# Patient Record
Sex: MALE | Race: WHITE | NOT HISPANIC OR LATINO | URBAN - METROPOLITAN AREA
[De-identification: names, ages, dates, MRNs, and addresses within clinical notes are randomized per-mention and may not be internally consistent; named-entity substitution may affect disease eponyms.]

---

## 2024-04-09 ENCOUNTER — INPATIENT (INPATIENT)
Facility: HOSPITAL | Age: 89
LOS: 6 days | Discharge: NURSING, REHAB OR LONG-TERM CARE | DRG: 480 | End: 2024-04-16
Attending: INTERNAL MEDICINE | Admitting: INTERNAL MEDICINE
Payer: MEDICARE

## 2024-04-09 VITALS
OXYGEN SATURATION: 97 % | DIASTOLIC BLOOD PRESSURE: 58 MMHG | SYSTOLIC BLOOD PRESSURE: 133 MMHG | HEART RATE: 95 BPM | RESPIRATION RATE: 18 BRPM

## 2024-04-09 DIAGNOSIS — S72.001A FRACTURE OF UNSPECIFIED PART OF NECK OF RIGHT FEMUR, INITIAL ENCOUNTER FOR CLOSED FRACTURE: ICD-10-CM
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
## 2024-04-09 LAB
ALBUMIN SERPL ELPH-MCNC: 3.2 G/DL — LOW (ref 3.3–5)
ALBUMIN SERPL ELPH-MCNC: 3.7 G/DL — SIGNIFICANT CHANGE UP (ref 3.3–5)
ALP SERPL-CCNC: 73 U/L — SIGNIFICANT CHANGE UP (ref 40–120)
ALP SERPL-CCNC: 77 U/L — SIGNIFICANT CHANGE UP (ref 40–120)
ALT FLD-CCNC: 11 U/L — SIGNIFICANT CHANGE UP (ref 10–45)
ALT FLD-CCNC: 16 U/L — SIGNIFICANT CHANGE UP (ref 10–45)
ANION GAP SERPL CALC-SCNC: 10 MMOL/L — SIGNIFICANT CHANGE UP (ref 5–17)
ANION GAP SERPL CALC-SCNC: 14 MMOL/L — SIGNIFICANT CHANGE UP (ref 5–17)
ANION GAP SERPL CALC-SCNC: 17 MMOL/L — SIGNIFICANT CHANGE UP (ref 5–17)
APPEARANCE UR: CLEAR — SIGNIFICANT CHANGE UP
APTT BLD: 29.4 SEC — SIGNIFICANT CHANGE UP (ref 24.5–35.6)
AST SERPL-CCNC: 12 U/L — SIGNIFICANT CHANGE UP (ref 10–40)
AST SERPL-CCNC: 57 U/L — HIGH (ref 10–40)
BACTERIA # UR AUTO: NEGATIVE /HPF — SIGNIFICANT CHANGE UP
BASE EXCESS BLDV CALC-SCNC: -2.5 MMOL/L — LOW (ref -2–3)
BASOPHILS # BLD AUTO: 0.03 K/UL — SIGNIFICANT CHANGE UP (ref 0–0.2)
BASOPHILS NFR BLD AUTO: 0.5 % — SIGNIFICANT CHANGE UP (ref 0–2)
BILIRUB SERPL-MCNC: 0.6 MG/DL — SIGNIFICANT CHANGE UP (ref 0.2–1.2)
BILIRUB SERPL-MCNC: 0.7 MG/DL — SIGNIFICANT CHANGE UP (ref 0.2–1.2)
BILIRUB UR-MCNC: NEGATIVE — SIGNIFICANT CHANGE UP
BLD GP AB SCN SERPL QL: NEGATIVE — SIGNIFICANT CHANGE UP
BUN SERPL-MCNC: 28 MG/DL — HIGH (ref 7–23)
BUN SERPL-MCNC: 28 MG/DL — HIGH (ref 7–23)
BUN SERPL-MCNC: 29 MG/DL — HIGH (ref 7–23)
CALCIUM SERPL-MCNC: 8.1 MG/DL — LOW (ref 8.4–10.5)
CALCIUM SERPL-MCNC: 8.2 MG/DL — LOW (ref 8.4–10.5)
CALCIUM SERPL-MCNC: 8.6 MG/DL — SIGNIFICANT CHANGE UP (ref 8.4–10.5)
CAST: 6 /LPF — HIGH (ref 0–4)
CHLORIDE SERPL-SCNC: 109 MMOL/L — HIGH (ref 96–108)
CHLORIDE SERPL-SCNC: 109 MMOL/L — HIGH (ref 96–108)
CHLORIDE SERPL-SCNC: 110 MMOL/L — HIGH (ref 96–108)
CK SERPL-CCNC: 193 U/L — SIGNIFICANT CHANGE UP (ref 30–200)
CK SERPL-CCNC: 280 U/L — HIGH (ref 30–200)
CO2 BLDV-SCNC: 27 MMOL/L — HIGH (ref 22–26)
CO2 SERPL-SCNC: 17 MMOL/L — LOW (ref 22–31)
CO2 SERPL-SCNC: 18 MMOL/L — LOW (ref 22–31)
CO2 SERPL-SCNC: 23 MMOL/L — SIGNIFICANT CHANGE UP (ref 22–31)
COLOR SPEC: YELLOW — SIGNIFICANT CHANGE UP
CREAT SERPL-MCNC: 1.21 MG/DL — SIGNIFICANT CHANGE UP (ref 0.5–1.3)
CREAT SERPL-MCNC: 1.28 MG/DL — SIGNIFICANT CHANGE UP (ref 0.5–1.3)
CREAT SERPL-MCNC: 1.29 MG/DL — SIGNIFICANT CHANGE UP (ref 0.5–1.3)
DIFF PNL FLD: NEGATIVE — SIGNIFICANT CHANGE UP
EGFR: 50 ML/MIN/1.73M2 — LOW
EGFR: 51 ML/MIN/1.73M2 — LOW
EGFR: 54 ML/MIN/1.73M2 — LOW
EOSINOPHIL # BLD AUTO: 0.19 K/UL — SIGNIFICANT CHANGE UP (ref 0–0.5)
EOSINOPHIL NFR BLD AUTO: 3.3 % — SIGNIFICANT CHANGE UP (ref 0–6)
ETHANOL SERPL-MCNC: <10 MG/DL — SIGNIFICANT CHANGE UP (ref 0–10)
GAS PNL BLDV: SIGNIFICANT CHANGE UP
GLUCOSE SERPL-MCNC: 140 MG/DL — HIGH (ref 70–99)
GLUCOSE SERPL-MCNC: 157 MG/DL — HIGH (ref 70–99)
GLUCOSE SERPL-MCNC: 160 MG/DL — HIGH (ref 70–99)
GLUCOSE UR QL: NEGATIVE MG/DL — SIGNIFICANT CHANGE UP
HCO3 BLDV-SCNC: 25 MMOL/L — SIGNIFICANT CHANGE UP (ref 22–29)
HCT VFR BLD CALC: 43.1 % — SIGNIFICANT CHANGE UP (ref 39–50)
HGB BLD-MCNC: 13.8 G/DL — SIGNIFICANT CHANGE UP (ref 13–17)
HYALINE CASTS # UR AUTO: PRESENT
IMM GRANULOCYTES NFR BLD AUTO: 1.5 % — HIGH (ref 0–0.9)
INR BLD: 1.07 RATIO — SIGNIFICANT CHANGE UP (ref 0.85–1.18)
KETONES UR-MCNC: NEGATIVE MG/DL — SIGNIFICANT CHANGE UP
LEUKOCYTE ESTERASE UR-ACNC: NEGATIVE — SIGNIFICANT CHANGE UP
LIDOCAIN IGE QN: 21 U/L — SIGNIFICANT CHANGE UP (ref 7–60)
LYMPHOCYTES # BLD AUTO: 0.95 K/UL — LOW (ref 1–3.3)
LYMPHOCYTES # BLD AUTO: 16.3 % — SIGNIFICANT CHANGE UP (ref 13–44)
MCHC RBC-ENTMCNC: 28.9 PG — SIGNIFICANT CHANGE UP (ref 27–34)
MCHC RBC-ENTMCNC: 32 GM/DL — SIGNIFICANT CHANGE UP (ref 32–36)
MCV RBC AUTO: 90.2 FL — SIGNIFICANT CHANGE UP (ref 80–100)
MONOCYTES # BLD AUTO: 0.3 K/UL — SIGNIFICANT CHANGE UP (ref 0–0.9)
MONOCYTES NFR BLD AUTO: 5.1 % — SIGNIFICANT CHANGE UP (ref 2–14)
NEUTROPHILS # BLD AUTO: 4.28 K/UL — SIGNIFICANT CHANGE UP (ref 1.8–7.4)
NEUTROPHILS NFR BLD AUTO: 73.3 % — SIGNIFICANT CHANGE UP (ref 43–77)
NITRITE UR-MCNC: NEGATIVE — SIGNIFICANT CHANGE UP
NRBC # BLD: 0 /100 WBCS — SIGNIFICANT CHANGE UP (ref 0–0)
PCO2 BLDV: 55 MMHG — SIGNIFICANT CHANGE UP (ref 42–55)
PH BLDV: 7.27 — LOW (ref 7.32–7.43)
PH UR: 5.5 — SIGNIFICANT CHANGE UP (ref 5–8)
PLATELET # BLD AUTO: 158 K/UL — SIGNIFICANT CHANGE UP (ref 150–400)
PO2 BLDV: 29 MMHG — SIGNIFICANT CHANGE UP (ref 25–45)
POTASSIUM SERPL-MCNC: 4.1 MMOL/L — SIGNIFICANT CHANGE UP (ref 3.5–5.3)
POTASSIUM SERPL-MCNC: 4.3 MMOL/L — SIGNIFICANT CHANGE UP (ref 3.5–5.3)
POTASSIUM SERPL-MCNC: 7.6 MMOL/L — CRITICAL HIGH (ref 3.5–5.3)
POTASSIUM SERPL-SCNC: 4.1 MMOL/L — SIGNIFICANT CHANGE UP (ref 3.5–5.3)
POTASSIUM SERPL-SCNC: 4.3 MMOL/L — SIGNIFICANT CHANGE UP (ref 3.5–5.3)
POTASSIUM SERPL-SCNC: 7.6 MMOL/L — CRITICAL HIGH (ref 3.5–5.3)
PROT SERPL-MCNC: 5.5 G/DL — LOW (ref 6–8.3)
PROT SERPL-MCNC: 6.6 G/DL — SIGNIFICANT CHANGE UP (ref 6–8.3)
PROT UR-MCNC: 30 MG/DL
PROTHROM AB SERPL-ACNC: 11.8 SEC — SIGNIFICANT CHANGE UP (ref 9.5–13)
RBC # BLD: 4.78 M/UL — SIGNIFICANT CHANGE UP (ref 4.2–5.8)
RBC # FLD: 13.9 % — SIGNIFICANT CHANGE UP (ref 10.3–14.5)
RBC CASTS # UR COMP ASSIST: 2 /HPF — SIGNIFICANT CHANGE UP (ref 0–4)
REVIEW: SIGNIFICANT CHANGE UP
RH IG SCN BLD-IMP: NEGATIVE — SIGNIFICANT CHANGE UP
SAO2 % BLDV: 40.9 % — LOW (ref 67–88)
SODIUM SERPL-SCNC: 140 MMOL/L — SIGNIFICANT CHANGE UP (ref 135–145)
SODIUM SERPL-SCNC: 142 MMOL/L — SIGNIFICANT CHANGE UP (ref 135–145)
SODIUM SERPL-SCNC: 145 MMOL/L — SIGNIFICANT CHANGE UP (ref 135–145)
SP GR SPEC: >1.03 — HIGH (ref 1–1.03)
SQUAMOUS # UR AUTO: 2 /HPF — SIGNIFICANT CHANGE UP (ref 0–5)
TROPONIN T, HIGH SENSITIVITY RESULT: 106 NG/L — HIGH (ref 0–51)
TROPONIN T, HIGH SENSITIVITY RESULT: 29 NG/L — SIGNIFICANT CHANGE UP (ref 0–51)
TROPONIN T, HIGH SENSITIVITY RESULT: 85 NG/L — HIGH (ref 0–51)
UROBILINOGEN FLD QL: 0.2 MG/DL — SIGNIFICANT CHANGE UP (ref 0.2–1)
WBC # BLD: 5.84 K/UL — SIGNIFICANT CHANGE UP (ref 3.8–10.5)
WBC # FLD AUTO: 5.84 K/UL — SIGNIFICANT CHANGE UP (ref 3.8–10.5)
WBC UR QL: 2 /HPF — SIGNIFICANT CHANGE UP (ref 0–5)

## 2024-04-09 PROCEDURE — 12011 RPR F/E/E/N/L/M 2.5 CM/<: CPT

## 2024-04-09 PROCEDURE — 73502 X-RAY EXAM HIP UNI 2-3 VIEWS: CPT | Mod: 26,RT

## 2024-04-09 PROCEDURE — 72170 X-RAY EXAM OF PELVIS: CPT | Mod: 26,XE

## 2024-04-09 PROCEDURE — 73552 X-RAY EXAM OF FEMUR 2/>: CPT | Mod: 26,RT

## 2024-04-09 PROCEDURE — 76377 3D RENDER W/INTRP POSTPROCES: CPT | Mod: 26

## 2024-04-09 PROCEDURE — 73120 X-RAY EXAM OF HAND: CPT | Mod: 26,50

## 2024-04-09 PROCEDURE — 71260 CT THORAX DX C+: CPT | Mod: 26,MC

## 2024-04-09 PROCEDURE — 73560 X-RAY EXAM OF KNEE 1 OR 2: CPT | Mod: 26,RT

## 2024-04-09 PROCEDURE — 12001 RPR S/N/AX/GEN/TRNK 2.5CM/<: CPT | Mod: 59

## 2024-04-09 PROCEDURE — 93321 DOPPLER ECHO F-UP/LMTD STD: CPT | Mod: 26

## 2024-04-09 PROCEDURE — 70450 CT HEAD/BRAIN W/O DYE: CPT | Mod: 26,MC

## 2024-04-09 PROCEDURE — 71045 X-RAY EXAM CHEST 1 VIEW: CPT | Mod: 26

## 2024-04-09 PROCEDURE — 72125 CT NECK SPINE W/O DYE: CPT | Mod: 26,MC

## 2024-04-09 PROCEDURE — 99285 EMERGENCY DEPT VISIT HI MDM: CPT | Mod: 25

## 2024-04-09 PROCEDURE — 74177 CT ABD & PELVIS W/CONTRAST: CPT | Mod: 26,MC

## 2024-04-09 PROCEDURE — 93308 TTE F-UP OR LMTD: CPT | Mod: 26

## 2024-04-09 PROCEDURE — 72192 CT PELVIS W/O DYE: CPT | Mod: 26,59,MC

## 2024-04-09 RX ORDER — HALOPERIDOL DECANOATE 100 MG/ML
5 INJECTION INTRAMUSCULAR ONCE
Refills: 0 | Status: COMPLETED | OUTPATIENT
Start: 2024-04-09 | End: 2024-04-09

## 2024-04-09 RX ORDER — MORPHINE SULFATE 50 MG/1
4 CAPSULE, EXTENDED RELEASE ORAL ONCE
Refills: 0 | Status: DISCONTINUED | OUTPATIENT
Start: 2024-04-09 | End: 2024-04-09

## 2024-04-09 RX ORDER — METOPROLOL TARTRATE 50 MG
50 TABLET ORAL DAILY
Refills: 0 | Status: DISCONTINUED | OUTPATIENT
Start: 2024-04-09 | End: 2024-04-10

## 2024-04-09 RX ORDER — METOPROLOL TARTRATE 50 MG
1 TABLET ORAL
Refills: 0 | DISCHARGE

## 2024-04-09 RX ORDER — SODIUM CHLORIDE 9 MG/ML
1000 INJECTION INTRAMUSCULAR; INTRAVENOUS; SUBCUTANEOUS
Refills: 0 | Status: DISCONTINUED | OUTPATIENT
Start: 2024-04-09 | End: 2024-04-10

## 2024-04-09 RX ORDER — SODIUM CHLORIDE 9 MG/ML
1000 INJECTION INTRAMUSCULAR; INTRAVENOUS; SUBCUTANEOUS
Refills: 0 | Status: DISCONTINUED | OUTPATIENT
Start: 2024-04-09 | End: 2024-04-09

## 2024-04-09 RX ORDER — DONEPEZIL HYDROCHLORIDE 10 MG/1
10 TABLET, FILM COATED ORAL AT BEDTIME
Refills: 0 | Status: DISCONTINUED | OUTPATIENT
Start: 2024-04-09 | End: 2024-04-10

## 2024-04-09 RX ORDER — DONEPEZIL HYDROCHLORIDE 10 MG/1
1 TABLET, FILM COATED ORAL
Refills: 0 | DISCHARGE

## 2024-04-09 RX ORDER — TETANUS TOXOID, REDUCED DIPHTHERIA TOXOID AND ACELLULAR PERTUSSIS VACCINE, ADSORBED 5; 2.5; 8; 8; 2.5 [IU]/.5ML; [IU]/.5ML; UG/.5ML; UG/.5ML; UG/.5ML
0.5 SUSPENSION INTRAMUSCULAR ONCE
Refills: 0 | Status: COMPLETED | OUTPATIENT
Start: 2024-04-09 | End: 2024-04-09

## 2024-04-09 RX ORDER — ACETAMINOPHEN 500 MG
1000 TABLET ORAL ONCE
Refills: 0 | Status: COMPLETED | OUTPATIENT
Start: 2024-04-09 | End: 2024-04-09

## 2024-04-09 RX ORDER — SODIUM CHLORIDE 9 MG/ML
250 INJECTION INTRAMUSCULAR; INTRAVENOUS; SUBCUTANEOUS ONCE
Refills: 0 | Status: COMPLETED | OUTPATIENT
Start: 2024-04-09 | End: 2024-04-09

## 2024-04-09 RX ORDER — HEPARIN SODIUM 5000 [USP'U]/ML
5000 INJECTION INTRAVENOUS; SUBCUTANEOUS EVERY 12 HOURS
Refills: 0 | Status: DISCONTINUED | OUTPATIENT
Start: 2024-04-09 | End: 2024-04-09

## 2024-04-09 RX ADMIN — Medication 1000 MILLIGRAM(S): at 10:00

## 2024-04-09 RX ADMIN — Medication 400 MILLIGRAM(S): at 06:58

## 2024-04-09 RX ADMIN — SODIUM CHLORIDE 75 MILLILITER(S): 9 INJECTION INTRAMUSCULAR; INTRAVENOUS; SUBCUTANEOUS at 16:17

## 2024-04-09 RX ADMIN — HALOPERIDOL DECANOATE 5 MILLIGRAM(S): 100 INJECTION INTRAMUSCULAR at 09:45

## 2024-04-09 RX ADMIN — Medication 1000 MILLIGRAM(S): at 16:15

## 2024-04-09 RX ADMIN — MORPHINE SULFATE 4 MILLIGRAM(S): 50 CAPSULE, EXTENDED RELEASE ORAL at 08:58

## 2024-04-09 RX ADMIN — MORPHINE SULFATE 4 MILLIGRAM(S): 50 CAPSULE, EXTENDED RELEASE ORAL at 17:54

## 2024-04-09 RX ADMIN — TETANUS TOXOID, REDUCED DIPHTHERIA TOXOID AND ACELLULAR PERTUSSIS VACCINE, ADSORBED 0.5 MILLILITER(S): 5; 2.5; 8; 8; 2.5 SUSPENSION INTRAMUSCULAR at 06:58

## 2024-04-09 RX ADMIN — DONEPEZIL HYDROCHLORIDE 10 MILLIGRAM(S): 10 TABLET, FILM COATED ORAL at 22:11

## 2024-04-09 RX ADMIN — SODIUM CHLORIDE 250 MILLILITER(S): 9 INJECTION INTRAMUSCULAR; INTRAVENOUS; SUBCUTANEOUS at 06:57

## 2024-04-09 RX ADMIN — HEPARIN SODIUM 5000 UNIT(S): 5000 INJECTION INTRAVENOUS; SUBCUTANEOUS at 17:26

## 2024-04-09 RX ADMIN — SODIUM CHLORIDE 250 MILLILITER(S): 9 INJECTION INTRAMUSCULAR; INTRAVENOUS; SUBCUTANEOUS at 07:40

## 2024-04-09 RX ADMIN — MORPHINE SULFATE 4 MILLIGRAM(S): 50 CAPSULE, EXTENDED RELEASE ORAL at 10:00

## 2024-04-09 RX ADMIN — MORPHINE SULFATE 4 MILLIGRAM(S): 50 CAPSULE, EXTENDED RELEASE ORAL at 17:41

## 2024-04-09 NOTE — CONSULT NOTE ADULT - ASSESSMENT
ASSESSMENT & PLAN  89yMale w/ R IT fx s/p unwitnessed fall     PLAN  -NWB RLE  -Bedrest  -OR planned for 4/10 AM   -f/u preop: CBC, BMP, coags, T&S x2 (PLEASE ORDER FOR 1AM 4/10)   -NPO past midnight except meds  -IVF while NPO   -hold chemical DVT ppx for OR; SCDs OK  -please document medical clearance ASAP for OR  -pain control  -ice/cold

## 2024-04-09 NOTE — ED ADULT NURSE REASSESSMENT NOTE - NSFALLHARMRISKINTERV_ED_ALL_ED
Assistance OOB with selected safe patient handling equipment if applicable/Assistance with ambulation/Communicate risk of Fall with Harm to all staff, patient, and family/Monitor gait and stability/Provide visual cue: red socks, yellow wristband, yellow gown, etc/Reinforce activity limits and safety measures with patient and family/Bed in lowest position, wheels locked, appropriate side rails in place/Call bell, personal items and telephone in reach/Instruct patient to call for assistance before getting out of bed/chair/stretcher/Non-slip footwear applied when patient is off stretcher/[place] to call system/Physically safe environment - no spills, clutter or unnecessary equipment/Purposeful Proactive Rounding/Room/bathroom lighting operational, light cord in reach

## 2024-04-09 NOTE — H&P ADULT - ASSESSMENT
89 yo male h/o dementia, htn, cad s/p pci, here after being found down on floor with head trauma and with right hip fracture   also with +trops    right hip fx  ortho eval noted  plan for OR  will need echo and cards clearance    +trop  possible nstemi  cards consulted  trend trop  tele  echo pending    cad s/p pci  f/u echo  cards f/u    htn  cont home meds    head lac  s/p sutures  local wound care    PT    Advanced care planning was discussed with patient and family.  Advanced care planning forms were reviewed and discussed as appropriate.  Differential diagnosis and plan of care discussed with patient after the evaluation.   Pain assessed and judicious use of narcotics when appropriate was discussed.  Importance of Fall prevention discussed.  Counseling on Smoking and Alcohol cessation was offered when appropriate.  Counseling on Diet, exercise, and medication compliance was done.       Approx 75 minutes spent. 89 yo male h/o dementia, htn, cad s/p pci, here after being found down on floor with head trauma and with right hip fracture   also with +trops    right hip fx  ortho eval noted  plan for OR  will need echo and cards clearance    +trop  possible nstemi  cards consulted  trend trop  tele  echo pending    cad s/p pci  f/u echo  cards f/u    htn  cont home meds    head lac  s/p sutures  local wound care    dementia  stable  cont Donepezil      PT    Advanced care planning was discussed with patient and family.  Advanced care planning forms were reviewed and discussed as appropriate.  Differential diagnosis and plan of care discussed with patient after the evaluation.   Pain assessed and judicious use of narcotics when appropriate was discussed.  Importance of Fall prevention discussed.  Counseling on Smoking and Alcohol cessation was offered when appropriate.  Counseling on Diet, exercise, and medication compliance was done.       Approx 75 minutes spent.

## 2024-04-09 NOTE — CONSULT NOTE ADULT - SUBJECTIVE AND OBJECTIVE BOX
HPI  89yMale w/ R hip pain after being found down. Pt only A&Ox1 in ED.  History obtained from son Vlad.  Per son pt lives at home with visiting nurse services and was found down outside by neighbor early this AM.  Camera footage suggests patient likely fell.  Was brought to ED and now has R hip pain.  Has been unableto bear weight in the RLE since the fall. Pt unclear regarding events surrounding incident and does not know if he hit his head or lost consciousness but does have a scalp lac. At baseline, pt lives in apartment with help of nursing services and ambulates with a walker/cane    ROS  Negative unless otherwise specified in HPI.    PAST MEDICAL & SURGICAL Hx  PAST MEDICAL & SURGICAL HISTORY:      MEDICATIONS  Home Medications:      ALLERGIES  No Known Allergies      FAMILY Hx  FAMILY HISTORY:      SOCIAL Hx  Social History:      VITALS  Vital Signs Last 24 Hrs  T(C): 36.7 (09 Apr 2024 07:13), Max: 36.7 (09 Apr 2024 07:13)  T(F): 98 (09 Apr 2024 07:13), Max: 98 (09 Apr 2024 07:13)  HR: 85 (09 Apr 2024 11:22) (85 - 105)  BP: 97/57 (09 Apr 2024 11:22) (94/58 - 170/120)  BP(mean): 70 (09 Apr 2024 11:22) (70 - 80)  RR: 18 (09 Apr 2024 11:22) (18 - 24)  SpO2: 97% (09 Apr 2024 11:22) (94% - 98%)    Parameters below as of 09 Apr 2024 11:22  Patient On (Oxygen Delivery Method): room air        PHYSICAL EXAM  Gen: Lying in bed, NAD  Resp: No increased WOB  Head:   R forehead lac  RLE:  Skin intact, shortened and externally rotated  No appreciable mihir and no ecchymosis over R hip  +TTP over R hip, no TTP along remainder of extremity; compartments soft  Limited ROM at hip 2/2 pain  +Log roll test  +Pain with axial loading  Motor: TA/EHL/GS/FHL intact  Sensory: DP/SP/Tib/Melania/Saph SILT  +DP pulse, WWP    Secondary survey:  No TTP along spine or other extremities, pelvis grossly stable, SILT and compartments soft throughout    LABS                        13.8   5.84  )-----------( 158      ( 09 Apr 2024 06:54 )             43.1     04-09    142  |  109<H>  |  28<H>  ----------------------------<  140<H>  4.1   |  23  |  1.28    Ca    8.2<L>      09 Apr 2024 09:09    TPro  5.5<L>  /  Alb  3.2<L>  /  TBili  0.6  /  DBili  x   /  AST  12  /  ALT  11  /  AlkPhos  73  04-09    PT/INR - ( 09 Apr 2024 06:54 )   PT: 11.8 sec;   INR: 1.07 ratio         PTT - ( 09 Apr 2024 06:54 )  PTT:29.4 sec    IMAGING  XRs: R comminuted IT fx (personal read)

## 2024-04-09 NOTE — H&P ADULT - HISTORY OF PRESENT ILLNESS
89 yo male h/o htn, dementia, cad s/p pci, p/w R hip pain after being found down. Pt only A&Ox1 in ED.  History obtained from son Vlad.  Per son pt lives at home with visiting nurse services and was found down outside by neighbor early this AM.  Camera footage suggests patient likely fell.  Was brought to ED and now has R hip pain.  Has been unableto bear weight in the RLE since the fall. Pt unclear regarding events surrounding incident and does not know if he hit his head or lost consciousness but does have a scalp lac. At baseline, pt lives in apartment with help of nursing services and ambulates with a walker/cane

## 2024-04-09 NOTE — ED ADULT NURSE NOTE - NSFALLHARMRISKINTERV_ED_ALL_ED
Assistance OOB with selected safe patient handling equipment if applicable/Assistance with ambulation/Communicate risk of Fall with Harm to all staff, patient, and family/Monitor gait and stability/Monitor for mental status changes and reorient to person, place, and time, as needed/Move patient closer to nursing station/within visual sight of ED staff/Provide visual cue: red socks, yellow wristband, yellow gown, etc/Reinforce activity limits and safety measures with patient and family/Toileting schedule using arm’s reach rule for commode and bathroom/Use of alarms - bed, stretcher, chair and/or video monitoring/Bed in lowest position, wheels locked, appropriate side rails in place/Call bell, personal items and telephone in reach/Instruct patient to call for assistance before getting out of bed/chair/stretcher/Non-slip footwear applied when patient is off stretcher/[place] to call system/Physically safe environment - no spills, clutter or unnecessary equipment/Purposeful Proactive Rounding/Room/bathroom lighting operational, light cord in reach

## 2024-04-09 NOTE — ED ADULT NURSE REASSESSMENT NOTE - NS ED NURSE REASSESS COMMENT FT1
Xray resulted with rt trochanteric comminuted fracture, VS remains wdl. Son at bedside and informed about plan of care. No pending stat orders noted, patient admitted to telemtry for Rt hip fracture awaiting for bed availability.
Received a 89M from RN Yulia, patient on a c collar s/p fall and was found by a neighbor early this morning on the ground outside of patient's house. Patient with unknown medical history, oriented to self and place, unable to recall what happened to him. On exam, noted a skin tear in the rt forehead aREa, rt eyebrow laceration and skin tear in the left hand and laceration in the rt 3rd distal finegr. Wound care performed, due meds given as ordered. VS WDL. Pending xray and CT scan results.  Lac repair performed by ED xiomara under local anesthesia.

## 2024-04-09 NOTE — PATIENT PROFILE ADULT - FALL HARM RISK - HARM RISK INTERVENTIONS

## 2024-04-09 NOTE — ED PROVIDER NOTE - PROGRESS NOTE DETAILS
Real PGY3: sign out received from Dr. Olmos. Patient is a 89 y M, with unknown PMH, coming from home, found down on the ground outside by the neighbors. Patient reports that he does not remember what happened. Reports that he lives with his wife Aster. He does not remember the contact number for Aster. He is complaining of L middle finger pain. No other complaint. VSWNL. PE: patient is NAD, AAOx2, able to answer questions, follow commands, patient is on C-collar, no chest wall or abdominal tenderness on palpation, multiple skin tears noted on R face and R middle finger. R hip is shortened and externally rotated with limited ROM. Otherwise ROM of RUE/LUE/LLE normal. Patient is pending lab work, pan CT scan for trauma workup. Real PGY3: C-collar cleared. Orthopedic was consulted for R hip fx. Per ortho, will admit to medicine for medical clearance and surgical intervention in the am tmr.

## 2024-04-09 NOTE — CONSULT NOTE ADULT - SUBJECTIVE AND OBJECTIVE BOX
CHIEF COMPLAINT:    HISTORY OF PRESENT ILLNESS:  89yMale w/ R hip pain after being found down. Pt only A&Ox2 in ED.  History obtained from chart review and Dr. Clifford,  Per son pt lives at home with visiting nurse services and was found down outside by neighbor early this AM.  Camera footage suggests patient likely fell.  Was brought to ED and now has R hip pain.  Has been unableto bear weight in the RLE since the fall. Pt unclear regarding events surrounding incident and does not know if he hit his head or lost consciousness but does have a scalp lac. At baseline, pt lives in apartment with help of nursing services and ambulates with a walker/cane  No chest pain or shortness of breath     PAST MEDICAL & SURGICAL HISTORY:  CAD (coronary atherosclerotic disease)      Dementia      Hypertension              MEDICATIONS:  heparin   Injectable 5000 Unit(s) SubCutaneous every 12 hours  metoprolol tartrate 50 milliGRAM(s) Oral daily        donepezil 10 milliGRAM(s) Oral at bedtime        sodium chloride 0.9%. 1000 milliLiter(s) IV Continuous <Continuous>      FAMILY HISTORY:      SOCIAL HISTORY:    [ ] Non-smoker  [ ] Smoker  [ ] Alcohol    Allergies    No Known Allergies    Intolerances    	    REVIEW OF SYSTEMS:  CONSTITUTIONAL: No fever, weight loss, or fatigue  EYES: No eye pain, visual disturbances, or discharge  ENMT:  No difficulty hearing, tinnitus, vertigo; No sinus or throat pain  NECK: No pain or stiffness  RESPIRATORY: No cough, wheezing, chills or hemoptysis; No Shortness of Breath  CARDIOVASCULAR: No chest pain, palpitations, passing out, dizziness, or leg swelling  GASTROINTESTINAL: No abdominal or epigastric pain. No nausea, vomiting, or hematemesis; No diarrhea or constipation. No melena or hematochezia.  GENITOURINARY: No dysuria, frequency, hematuria, or incontinence  NEUROLOGICAL: No headaches, memory loss, loss of strength, numbness, or tremors  SKIN: No itching, burning, rashes, or lesions   LYMPH Nodes: No enlarged glands  ENDOCRINE: No heat or cold intolerance; No hair loss  MUSCULOSKELETAL: + joint pain or swelling; No muscle, back, or extremity pain  PSYCHIATRIC: No depression, anxiety, mood swings, or difficulty sleeping  HEME/LYMPH: No easy bruising, or bleeding gums  ALLERY AND IMMUNOLOGIC: No hives or eczema	    [ ] All others negative	  [ ] Unable to obtain    PHYSICAL EXAM:  T(C): 36.6 (04-09-24 @ 17:55), Max: 36.7 (04-09-24 @ 07:13)  HR: 92 (04-09-24 @ 17:55) (85 - 105)  BP: 97/64 (04-09-24 @ 17:55) (94/58 - 170/120)  RR: 17 (04-09-24 @ 17:55) (17 - 24)  SpO2: 94% (04-09-24 @ 17:55) (94% - 98%)  Wt(kg): --  I&O's Summary      Appearance: NAD Right forehead hematoma   HEENT:   Normal oral mucosa, PERRL, EOMI	  Lymphatic: No lymphadenopathy  Cardiovascular: Normal S1 S2, No JVD, No murmurs, No edema  Respiratory: Decreased bs   Psychiatry: A & O x 2  Gastrointestinal:  Soft, Non-tender, + BS	  Skin: No rashes, No ecchymoses, No cyanosis	  Neurologic: Non-focal  RLE:  Skin intact, shortened and externally rotated  No appreciable mihir and no ecchymosis over R hip  +TTP over R hip, no TTP along remainder of extremity; compartments soft  Limited ROM at hip 2/2 pain  +Log roll test  +Pain with axial loading  Motor: TA/EHL/GS/FHL intact  Sensory: DP/SP/Tib/Melania/Saph SILT  +DP pulse, WWP    Vascular: Peripheral pulses palpable 2+ bilaterally    TELEMETRY: 	    ECG:  	  RADIOLOGY:  < from: Xray Knee 1 or 2 Views, Right (04.09.24 @ 10:27) >    ACC: [id number] EXAM:  XR HIP 2-3V RT   ORDERED BY: MANA PRIETO     ACC: [id number] EXAM:  XR FEMUR 2 VIEWS RT   ORDERED BY: MANA PRIETO     ACC: [id number] EXAM:  XR PELVIS AP ONLY 1-2 VIEWS   ORDERED BY: MANA PRIETO     ACC: [id number] EXAM:  XR KNEE 1-2 VIEWSRT   ORDERED BY: ROSA CALLES     PROCEDURE DATE:  04/09/2024          INTERPRETATION:  CLINICAL INDICATION: Right hip fracture    EXAM: Frontal view of the pelvis, 2 views of the right hip, 2 views of   the right femur and 2 views of the right knee    COMPARISON: Pelvic CT performed the same day.      IMPRESSION:  Comminuted displaced right intertrochanteric hip fracture with avulsion   of the lesser trochanter. No dislocation. No advanced hip arthrosis.   There is external rotation of the distal fracture fragment by   approximately 90 degrees. No advanced knee arthrosis. There are vascular   calcifications. 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    --- End of Report ---            ISSA CURRIE MD; Attending Radiologist  This document has been electronically signed. Apr 9 2024 11:15AM    < end of copied text >  < from: Xray Chest 1 View AP/PA (04.09.24 @ 10:26) >  ACC: [id number] EXAM:  XR CHEST AP OR PA 1V   ORDERED BY: MANA PRIETO     PROCEDURE DATE:  04/09/2024          INTERPRETATION:  EXAMINATION: XR CHEST    CLINICAL INDICATION: Trauma Code, fall    TECHNIQUE: Single frontal, portable view of the chest wasobtained.    COMPARISON: None.    FINDINGS:    The heart is normal in size.  The lungs are clear. No pleural effusion.  There is no pneumothorax.  No acute bony abnormality.    IMPRESSION:  Clear lungs.    --- End of Report ---        < end of copied text >    OTHER: 	  	  LABS:	 	    CARDIAC MARKERS:                                  13.8   5.84  )-----------( 158      ( 09 Apr 2024 06:54 )             43.1     04-09    145  |  110<H>  |  29<H>  ----------------------------<  157<H>  4.3   |  18<L>  |  1.29    Ca    8.1<L>      09 Apr 2024 10:43    TPro  5.5<L>  /  Alb  3.2<L>  /  TBili  0.6  /  DBili  x   /  AST  12  /  ALT  11  /  AlkPhos  73  04-09    proBNP:   Lipid Profile:   HgA1c:   TSH:

## 2024-04-09 NOTE — CONSULT NOTE ADULT - PROBLEM SELECTOR RECOMMENDATION 9
Ortho consulted   OR planned for 4/10 AM  acceptable (low to intermediate)  cardiac risk to proceed   RCRI 1  Pain control

## 2024-04-09 NOTE — PATIENT PROFILE ADULT - NSPROPTRIGHTBILLOFRIGHTS_GEN_A_NUR
Patient is currently scheduled for excision on 7/13 at 11:00.  Provider will be at hospital that afternoon.  Appointment will need to be moved to 10:40 (same day and location).     Voicemail message left letting patient know appointment will need to be rescheduled and requested return call to clinic.  Phone number and hours provided.    patient

## 2024-04-09 NOTE — ED PROCEDURE NOTE - CPROC ED TIME OUT STATEMENT1
“Patient's name, , procedure and correct site were confirmed during the [place] Timeout.”
“Patient's name, , procedure and correct site were confirmed during the [place] Timeout.”

## 2024-04-09 NOTE — H&P ADULT - NSHPPHYSICALEXAM_GEN_ALL_CORE
Vital Signs Last 24 Hrs  T(C): 36.7 (09 Apr 2024 07:13), Max: 36.7 (09 Apr 2024 07:13)  T(F): 98 (09 Apr 2024 07:13), Max: 98 (09 Apr 2024 07:13)  HR: 100 (09 Apr 2024 12:41) (85 - 105)  BP: 152/77 (09 Apr 2024 12:41) (94/58 - 170/120)  BP(mean): 102 (09 Apr 2024 12:41) (70 - 102)  RR: 22 (09 Apr 2024 12:41) (18 - 24)  SpO2: 96% (09 Apr 2024 12:41) (94% - 98%)    Parameters below as of 09 Apr 2024 12:41  Patient On (Oxygen Delivery Method): room air    PHYSICAL EXAM:  GENERAL: NAD, well-developed  HEAD:  +lac with sutures  EYES: EOMI, PERRLA, conjunctiva and sclera clear  NECK: Supple, No JVD  CHEST/LUNG: Clear to auscultation bilaterally; No wheeze  HEART: Regular rate and rhythm; No murmurs, rubs, or gallops  ABDOMEN: Soft, Nontender, Nondistended; Bowel sounds present  EXTREMITIES:  2+ Peripheral Pulses, No clubbing, cyanosis, or edema. RLE ext rotated   PSYCH: AAOx2  NEUROLOGY: non-focal  SKIN: No rashes or lesions

## 2024-04-09 NOTE — ED PROVIDER NOTE - ATTENDING CONTRIBUTION TO CARE
Attending GOLDIE Donald I performed a history and physical exam of the patient and discussed their management with the resident. I reviewed the resident's note and agree with the documented findings and plan of care, except as noted. My medical decision making and observations are as follows:    89 M with unknown PMH brought in by EMS after neighbors found patient fallen on ground outside.  Unknown downtime, unknown LOC.  Patient unable to recall events; has no acute complaints.  Denies use of aspirin or AC.  On exam, patient in no acute distress, normal work of breathing, speaking in full sentences.  ABCs intact. No raccoon sign or caruso sign. + Ecchymosis and skin tears noted to right frontal/parietal/temporal scalp with no active bleeding; c-collar in place with no midline C/T/L-spine tenderness.  No evidence of trauma or tenderness to palpation of face, C/T/L spine, chest wall, abdomen. Pelvis stable.  Skin tears noted to bilateral hands.  Right hip shortened and externally rotated with tenderness to palpation; distally neurovascularly intact.  5/5 strength, intact sensation, intact ROM, 2+ pulses x LUE and BLE.    MDM–mildly tachycardic and hypertensive otherwise vital signs within normal limits.  Concern for right hip fracture, will also evaluate for other traumatic injury with CT head, C-spine, chest, abdomen/pelvis, right hip and labs including CK.  On my independent interpretation, EKG shows sinus tachycardia rate 100, normal axis, normal intervals, T wave inversions noted to lateral and inferior leads without acute ischemia; no prior for comparison.    0700- signed out to Dr. Red pending labs, XR/CT, and likely admission.

## 2024-04-10 LAB
ANION GAP SERPL CALC-SCNC: 12 MMOL/L — SIGNIFICANT CHANGE UP (ref 5–17)
APTT BLD: 29.3 SEC — SIGNIFICANT CHANGE UP (ref 24.5–35.6)
BASE EXCESS BLDV CALC-SCNC: -4.5 MMOL/L — LOW (ref -2–3)
BLD GP AB SCN SERPL QL: NEGATIVE — SIGNIFICANT CHANGE UP
BUN SERPL-MCNC: 32 MG/DL — HIGH (ref 7–23)
CA-I SERPL-SCNC: 1.2 MMOL/L — SIGNIFICANT CHANGE UP (ref 1.15–1.33)
CALCIUM SERPL-MCNC: 8.8 MG/DL — SIGNIFICANT CHANGE UP (ref 8.4–10.5)
CHLORIDE BLDV-SCNC: 111 MMOL/L — HIGH (ref 96–108)
CHLORIDE SERPL-SCNC: 109 MMOL/L — HIGH (ref 96–108)
CO2 BLDV-SCNC: 22 MMOL/L — SIGNIFICANT CHANGE UP (ref 22–26)
CO2 SERPL-SCNC: 21 MMOL/L — LOW (ref 22–31)
CREAT SERPL-MCNC: 1.57 MG/DL — HIGH (ref 0.5–1.3)
CULTURE RESULTS: NO GROWTH — SIGNIFICANT CHANGE UP
EGFR: 40 ML/MIN/1.73M2 — LOW
GAS PNL BLDV: 139 MMOL/L — SIGNIFICANT CHANGE UP (ref 136–145)
GAS PNL BLDV: SIGNIFICANT CHANGE UP
GLUCOSE BLDV-MCNC: 141 MG/DL — HIGH (ref 70–99)
GLUCOSE SERPL-MCNC: 133 MG/DL — HIGH (ref 70–99)
HCO3 BLDV-SCNC: 21 MMOL/L — LOW (ref 22–29)
HCT VFR BLD CALC: 31.5 % — LOW (ref 39–50)
HCT VFR BLDA CALC: 32 % — LOW (ref 39–51)
HGB BLD CALC-MCNC: 10.6 G/DL — LOW (ref 12.6–17.4)
HGB BLD-MCNC: 10.4 G/DL — LOW (ref 13–17)
INR BLD: 1.15 RATIO — SIGNIFICANT CHANGE UP (ref 0.85–1.18)
LACTATE BLDV-MCNC: 2 MMOL/L — SIGNIFICANT CHANGE UP (ref 0.5–2)
MCHC RBC-ENTMCNC: 29.3 PG — SIGNIFICANT CHANGE UP (ref 27–34)
MCHC RBC-ENTMCNC: 33 GM/DL — SIGNIFICANT CHANGE UP (ref 32–36)
MCV RBC AUTO: 88.7 FL — SIGNIFICANT CHANGE UP (ref 80–100)
NRBC # BLD: 0 /100 WBCS — SIGNIFICANT CHANGE UP (ref 0–0)
PCO2 BLDV: 40 MMHG — LOW (ref 42–55)
PH BLDV: 7.33 — SIGNIFICANT CHANGE UP (ref 7.32–7.43)
PLATELET # BLD AUTO: 153 K/UL — SIGNIFICANT CHANGE UP (ref 150–400)
PO2 BLDV: 106 MMHG — HIGH (ref 25–45)
POTASSIUM BLDV-SCNC: 4.9 MMOL/L — SIGNIFICANT CHANGE UP (ref 3.5–5.1)
POTASSIUM SERPL-MCNC: 4.9 MMOL/L — SIGNIFICANT CHANGE UP (ref 3.5–5.3)
POTASSIUM SERPL-SCNC: 4.9 MMOL/L — SIGNIFICANT CHANGE UP (ref 3.5–5.3)
PROTHROM AB SERPL-ACNC: 12.6 SEC — SIGNIFICANT CHANGE UP (ref 9.5–13)
RBC # BLD: 3.55 M/UL — LOW (ref 4.2–5.8)
RBC # FLD: 14.1 % — SIGNIFICANT CHANGE UP (ref 10.3–14.5)
RH IG SCN BLD-IMP: NEGATIVE — SIGNIFICANT CHANGE UP
SAO2 % BLDV: 98.6 % — HIGH (ref 67–88)
SODIUM SERPL-SCNC: 142 MMOL/L — SIGNIFICANT CHANGE UP (ref 135–145)
SPECIMEN SOURCE: SIGNIFICANT CHANGE UP
TROPONIN T, HIGH SENSITIVITY RESULT: 97 NG/L — HIGH (ref 0–51)
WBC # BLD: 7.71 K/UL — SIGNIFICANT CHANGE UP (ref 3.8–10.5)
WBC # FLD AUTO: 7.71 K/UL — SIGNIFICANT CHANGE UP (ref 3.8–10.5)

## 2024-04-10 PROCEDURE — 99223 1ST HOSP IP/OBS HIGH 75: CPT

## 2024-04-10 DEVICE — SCREW LOKG 5X32.5MM: Type: IMPLANTABLE DEVICE | Site: RIGHT | Status: FUNCTIONAL

## 2024-04-10 DEVICE — NAIL TROCHANTERIC 120 DEG 11X170MM: Type: IMPLANTABLE DEVICE | Site: RIGHT | Status: FUNCTIONAL

## 2024-04-10 DEVICE — SCREW LAG GAMMA 10.5X95MM STRL: Type: IMPLANTABLE DEVICE | Site: RIGHT | Status: FUNCTIONAL

## 2024-04-10 DEVICE — PIN ORTHO PRECISION TAPER 3.9X450MM: Type: IMPLANTABLE DEVICE | Site: RIGHT | Status: FUNCTIONAL

## 2024-04-10 DEVICE — K-WIRE STRYKER 3.2MM X 450MM: Type: IMPLANTABLE DEVICE | Site: RIGHT | Status: FUNCTIONAL

## 2024-04-10 RX ORDER — LANOLIN ALCOHOL/MO/W.PET/CERES
3 CREAM (GRAM) TOPICAL AT BEDTIME
Refills: 0 | Status: DISCONTINUED | OUTPATIENT
Start: 2024-04-10 | End: 2024-04-16

## 2024-04-10 RX ORDER — OXYCODONE HYDROCHLORIDE 5 MG/1
2.5 TABLET ORAL EVERY 4 HOURS
Refills: 0 | Status: DISCONTINUED | OUTPATIENT
Start: 2024-04-10 | End: 2024-04-16

## 2024-04-10 RX ORDER — CEFAZOLIN SODIUM 1 G
2000 VIAL (EA) INJECTION EVERY 8 HOURS
Refills: 0 | Status: COMPLETED | OUTPATIENT
Start: 2024-04-10 | End: 2024-04-11

## 2024-04-10 RX ORDER — METOPROLOL TARTRATE 50 MG
50 TABLET ORAL DAILY
Refills: 0 | Status: DISCONTINUED | OUTPATIENT
Start: 2024-04-10 | End: 2024-04-16

## 2024-04-10 RX ORDER — ENOXAPARIN SODIUM 100 MG/ML
30 INJECTION SUBCUTANEOUS EVERY 24 HOURS
Refills: 0 | Status: DISCONTINUED | OUTPATIENT
Start: 2024-04-11 | End: 2024-04-16

## 2024-04-10 RX ORDER — ONDANSETRON 8 MG/1
4 TABLET, FILM COATED ORAL EVERY 6 HOURS
Refills: 0 | Status: DISCONTINUED | OUTPATIENT
Start: 2024-04-10 | End: 2024-04-16

## 2024-04-10 RX ORDER — SODIUM CHLORIDE 9 MG/ML
1000 INJECTION INTRAMUSCULAR; INTRAVENOUS; SUBCUTANEOUS
Refills: 0 | Status: DISCONTINUED | OUTPATIENT
Start: 2024-04-10 | End: 2024-04-16

## 2024-04-10 RX ORDER — CHLORHEXIDINE GLUCONATE 213 G/1000ML
1 SOLUTION TOPICAL DAILY
Refills: 0 | Status: DISCONTINUED | OUTPATIENT
Start: 2024-04-10 | End: 2024-04-10

## 2024-04-10 RX ORDER — HYDROMORPHONE HYDROCHLORIDE 2 MG/ML
0.2 INJECTION INTRAMUSCULAR; INTRAVENOUS; SUBCUTANEOUS
Refills: 0 | Status: DISCONTINUED | OUTPATIENT
Start: 2024-04-10 | End: 2024-04-11

## 2024-04-10 RX ORDER — SENNA PLUS 8.6 MG/1
2 TABLET ORAL AT BEDTIME
Refills: 0 | Status: DISCONTINUED | OUTPATIENT
Start: 2024-04-10 | End: 2024-04-16

## 2024-04-10 RX ORDER — DONEPEZIL HYDROCHLORIDE 10 MG/1
10 TABLET, FILM COATED ORAL AT BEDTIME
Refills: 0 | Status: DISCONTINUED | OUTPATIENT
Start: 2024-04-10 | End: 2024-04-16

## 2024-04-10 RX ORDER — OXYCODONE HYDROCHLORIDE 5 MG/1
5 TABLET ORAL EVERY 4 HOURS
Refills: 0 | Status: DISCONTINUED | OUTPATIENT
Start: 2024-04-10 | End: 2024-04-16

## 2024-04-10 RX ORDER — ACETAMINOPHEN 500 MG
975 TABLET ORAL EVERY 8 HOURS
Refills: 0 | Status: DISCONTINUED | OUTPATIENT
Start: 2024-04-10 | End: 2024-04-16

## 2024-04-10 RX ORDER — POLYETHYLENE GLYCOL 3350 17 G/17G
17 POWDER, FOR SOLUTION ORAL DAILY
Refills: 0 | Status: DISCONTINUED | OUTPATIENT
Start: 2024-04-10 | End: 2024-04-16

## 2024-04-10 RX ORDER — POVIDONE-IODINE 5 %
1 AEROSOL (ML) TOPICAL DAILY
Refills: 0 | Status: DISCONTINUED | OUTPATIENT
Start: 2024-04-10 | End: 2024-04-10

## 2024-04-10 RX ADMIN — Medication 50 MILLIGRAM(S): at 05:48

## 2024-04-10 RX ADMIN — Medication 1 APPLICATION(S): at 16:27

## 2024-04-10 RX ADMIN — CHLORHEXIDINE GLUCONATE 1 APPLICATION(S): 213 SOLUTION TOPICAL at 14:50

## 2024-04-10 RX ADMIN — SODIUM CHLORIDE 90 MILLILITER(S): 9 INJECTION INTRAMUSCULAR; INTRAVENOUS; SUBCUTANEOUS at 19:17

## 2024-04-10 RX ADMIN — SODIUM CHLORIDE 125 MILLILITER(S): 9 INJECTION INTRAMUSCULAR; INTRAVENOUS; SUBCUTANEOUS at 23:16

## 2024-04-10 RX ADMIN — SODIUM CHLORIDE 90 MILLILITER(S): 9 INJECTION INTRAMUSCULAR; INTRAVENOUS; SUBCUTANEOUS at 17:49

## 2024-04-10 RX ADMIN — SODIUM CHLORIDE 90 MILLILITER(S): 9 INJECTION INTRAMUSCULAR; INTRAVENOUS; SUBCUTANEOUS at 05:48

## 2024-04-10 NOTE — CONSULT NOTE ADULT - SUBJECTIVE AND OBJECTIVE BOX
Wound SURGERY CONSULT NOTE    HPI:  89 yo male h/o htn, dementia, cad s/p pci, p/w R hip pain after being found down. Pt only A&Ox1 in ED.  History obtained from son Vlad.  Per son pt lives at home with visiting nurse services and was found down outside by neighbor early this AM.  Camera footage suggests patient likely fell.  Was brought to ED and now has R hip pain.  Has been unableto bear weight in the RLE since the fall. Pt unclear regarding events surrounding incident and does not know if he hit his head or lost consciousness but does have a scalp lac. At baseline, pt lives in apartment with help of nursing services and ambulates with a walker/cane (09 Apr 2024 14:43)    Wound consult requested by team to assist w/ management of  rt finger/ forehead/  wound/ sacral injury.   Pt unable to  c/o pain, drainage, odor, color change,  or worsening swelling. Offloading and pericare initiated upon admission as pt Increasingly sedentary 2/2 to illness. Pt is Incontinent of urine & stool. (+) condom catheter. Son at bedside all questions asked and answered to son's expressed understanding and satisfaction.    Current Diet: Diet, NPO after Midnight:      NPO Start Date: 09-Apr-2024,   NPO Start Time: 23:59 (04-09-24 @ 15:33)      PAST MEDICAL & SURGICAL HISTORY:  CAD (coronary atherosclerotic disease)      Dementia      Hypertension      REVIEW OF SYSTEMS: Pt unable to offer      MEDICATIONS  (STANDING):  donepezil 10 milliGRAM(s) Oral at bedtime  metoprolol tartrate 50 milliGRAM(s) Oral daily  povidone iodine 10% Solution 1 Application(s) Topical daily  sodium chloride 0.9%. 1000 milliLiter(s) (90 mL/Hr) IV Continuous <Continuous>    MEDICATIONS  (PRN):      Allergies    No Known Allergies    Intolerances        SOCIAL HISTORY:  No hx of smoking, ETOH, drugs    FAMILY HISTORY:   No pertinent family hx among first degree relatives.    PHYSICAL EXAM:  Vital Signs Last 24 Hrs  T(C): 36.5 (10 Apr 2024 11:15), Max: 36.9 (09 Apr 2024 19:30)  T(F): 97.7 (10 Apr 2024 11:15), Max: 98.5 (10 Apr 2024 05:14)  HR: 84 (10 Apr 2024 11:15) (84 - 100)  BP: 128/55 (10 Apr 2024 11:15) (95/60 - 128/55)  BP(mean): --  RR: 18 (10 Apr 2024 11:15) (17 - 18)  SpO2: 96% (10 Apr 2024 11:15) (92% - 97%)    Parameters below as of 10 Apr 2024 11:15  Patient On (Oxygen Delivery Method): room air        NAD/ somnolent/ thin/ frail,  Versa Care P500   HEENT:  sclera clear, mucosa moist, throat clear, trachea midline, neck supple  Respiratory: nonlabored w/ equal chest rise  Gastrointestinal: soft NT/ND   : (+) condom cath  Neurology:  nonverbal, can not follow commands  Psych:  difficult to assess  Musculoskeletal:  no deformities/ contractures  Vascular: BLE equally cool, no cyanosis, clubbing, edema nor acute ischemia           BLE DP/PT pulses not palpable            BL radial pulses palpable           lt thigh dry skin  Skin:  thin, dry, pale, frail  Rt middle finger abrasion 1.0cm x 1.0cm x 0.0cm periwound with ecchymosis  Skin tear left dorsal hand ISTAP 3 scant serosanguinous drainage periwound with ecchymosis       There is no blistering, no drainage      No odor, increased warmth,  induration, fluctuance, nor crepitus  Sacral region blanchable redness,  (+) moisture associated skin damage     There is no blistering, no drainage      No odor, increased warmth,  induration, fluctuance, nor crepitus  Rt Forehead wound 8.0cm x 11.0cm with slough and skin flap scant drainage      There is no blistering, scant bloody drainage     No odor, increased warmth,  induration, fluctuance, nor crepitus    LABS/ CULTURES/ RADIOLOGY:                        10.4   7.71  )-----------( 153      ( 10 Apr 2024 02:50 )             31.5       142  |  109  |  32  ----------------------------<  133      [04-10-24 @ 02:50]  4.9   |  21  |  1.57        Ca     8.8     [04-10-24 @ 02:50]    TPro  5.5  /  Alb  3.2  /  TBili  0.6  /  DBili  x   /  AST  12  /  ALT  11  /  AlkPhos  73  [04-09-24 @ 09:09]    PT/INR: PT 12.6 , INR 1.15       [04-10-24 @ 02:50]  PTT: 29.3       [04-10-24 @ 02:50]          [04-09-24 @ 09:09]    < from: Xray Hand 2 Views, Bilateral (04.09.24 @ 10:27) >    ACC: [id number] EXAM:  XR HAND 2 VIEWS BI   ORDERED BY: MANA PRIETO     PROCEDURE DATE:  04/09/2024          INTERPRETATION:  CLINICAL INDICATION: Hand pain. Unwitnessed fall    EXAM: 2 views of the bilateral hands    COMPARISON: None      IMPRESSION:  Limited evaluation of the distal phalanges due to positioning. Bones are   demineralized. No acute fracture appreciated. 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 There is bilateral distal interphalangeal arthrosis.   No radiopaque foreign bodies visualized.    --- End of Report ---            ISSA CURRIE MD; Attending Radiologist  This document has been electronically signed. Apr 9 2024 11:17AM    < end of copied text >                               Wound SURGERY CONSULT NOTE    HPI:  89 yo male h/o htn, dementia, cad s/p pci, p/w R hip pain after being found down. Pt only A&Ox1 in ED.  History obtained from son Vlad.  Per son pt lives at home with visiting nurse services and was found down outside by neighbor early this AM.  Camera footage suggests patient likely fell.  Was brought to ED and now has R hip pain.  Has been unableto bear weight in the RLE since the fall. Pt unclear regarding events surrounding incident and does not know if he hit his head or lost consciousness but does have a scalp lac. At baseline, pt lives in apartment with help of nursing services and ambulates with a walker/cane (09 Apr 2024 14:43)    Wound consult requested by team to assist w/ management of  rt finger/ forehead/  wound/ sacral injury.   Pt unable to  c/o pain, drainage, odor, color change,  or worsening swelling. Offloading and pericare initiated upon admission as pt Increasingly sedentary 2/2 to illness. Pt is Incontinent of urine & stool. (+) condom catheter. Son at bedside all questions asked and answered to son's expressed understanding and satisfaction.    Current Diet: Diet, NPO after Midnight:      NPO Start Date: 09-Apr-2024,   NPO Start Time: 23:59 (04-09-24 @ 15:33)      PAST MEDICAL & SURGICAL HISTORY:  CAD (coronary atherosclerotic disease)      Dementia      Hypertension      REVIEW OF SYSTEMS: Pt unable to offer      MEDICATIONS  (STANDING):  donepezil 10 milliGRAM(s) Oral at bedtime  metoprolol tartrate 50 milliGRAM(s) Oral daily  povidone iodine 10% Solution 1 Application(s) Topical daily  sodium chloride 0.9%. 1000 milliLiter(s) (90 mL/Hr) IV Continuous <Continuous>    MEDICATIONS  (PRN):      Allergies    No Known Allergies    Intolerances        SOCIAL HISTORY:  No hx of smoking, ETOH, drugs    FAMILY HISTORY:   No pertinent family hx among first degree relatives.    PHYSICAL EXAM:  Vital Signs Last 24 Hrs  T(C): 36.5 (10 Apr 2024 11:15), Max: 36.9 (09 Apr 2024 19:30)  T(F): 97.7 (10 Apr 2024 11:15), Max: 98.5 (10 Apr 2024 05:14)  HR: 84 (10 Apr 2024 11:15) (84 - 100)  BP: 128/55 (10 Apr 2024 11:15) (95/60 - 128/55)  BP(mean): --  RR: 18 (10 Apr 2024 11:15) (17 - 18)  SpO2: 96% (10 Apr 2024 11:15) (92% - 97%)    Parameters below as of 10 Apr 2024 11:15  Patient On (Oxygen Delivery Method): room air        NAD/ somnolent/ thin/ frail,  Versa Care P500   HEENT:  sclera clear, mucosa moist, throat clear, trachea midline, neck supple  Respiratory: nonlabored w/ equal chest rise  Gastrointestinal: soft NT/ND   : (+) condom cath  Neurology:  nonverbal, can not follow commands  Psych:  difficult to assess  Musculoskeletal:  no deformities/ contractures  Vascular: BLE equally cool, no cyanosis, clubbing, edema nor acute ischemia           BLE DP/PT pulses not palpable            BL radial pulses palpable  Skin:  thin, dry, pale, frail  Rt middle finger scabbed abrasion 1.0cm x 1.0cm x 0.0cm periwound with ecchymosis  Skin tear left dorsal hand ISTAP 3 scant serosanguinous drainage periwound with ecchymosis       There is no blistering, no drainage      No odor, increased warmth,  induration, fluctuance, nor crepitus  Sacral region blanchable redness,  (+) moisture associated skin damage     There is no blistering, no drainage      No odor, increased warmth,  induration, fluctuance, nor crepitus  Rt Forehead wound 8.0cm x 11.0cm with slough and skin flap scant drainage      There is no blistering, scant bloody drainage     No odor, increased warmth,  induration, fluctuance, nor crepitus    LABS/ CULTURES/ RADIOLOGY:                        10.4   7.71  )-----------( 153      ( 10 Apr 2024 02:50 )             31.5       142  |  109  |  32  ----------------------------<  133      [04-10-24 @ 02:50]  4.9   |  21  |  1.57        Ca     8.8     [04-10-24 @ 02:50]    TPro  5.5  /  Alb  3.2  /  TBili  0.6  /  DBili  x   /  AST  12  /  ALT  11  /  AlkPhos  73  [04-09-24 @ 09:09]    PT/INR: PT 12.6 , INR 1.15       [04-10-24 @ 02:50]  PTT: 29.3       [04-10-24 @ 02:50]          [04-09-24 @ 09:09]    < from: Xray Hand 2 Views, Bilateral (04.09.24 @ 10:27) >    ACC: [id number] EXAM:  XR HAND 2 VIEWS BI   ORDERED BY: MANA PRIETO     PROCEDURE DATE:  04/09/2024          INTERPRETATION:  CLINICAL INDICATION: Hand pain. Unwitnessed fall    EXAM: 2 views of the bilateral hands    COMPARISON: None      IMPRESSION:  Limited evaluation of the distal phalanges due to positioning. Bones are   demineralized. No acute fracture appreciated. 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 There is bilateral distal interphalangeal arthrosis.   No radiopaque foreign bodies visualized.    --- End of Report ---            ISSA CURRIE MD; Attending Radiologist  This document has been electronically signed. Apr 9 2024 11:17AM    < end of copied text >

## 2024-04-10 NOTE — CHART NOTE - NSCHARTNOTEFT_GEN_A_CORE
Post-Operative Check    Patient was seen and evaluated in RR. Pt A&Ox1, resting comfortably. No signs of distress. Able to follow some simple commands.    Vitals:  T(C): 36.1 (10 Apr 2024 22:32), Max: 36.9 (10 Apr 2024 05:14)  T(F): 97 (10 Apr 2024 22:32), Max: 98.5 (10 Apr 2024 05:14)  HR: 104 (10 Apr 2024 23:15) (84 - 104)  BP: 120/54 (10 Apr 2024 23:15) (101/48 - 129/72)  BP(mean): 78 (10 Apr 2024 23:15) (69 - 102)  RR: 16 (10 Apr 2024 23:15) (16 - 18)  SpO2: 99% (10 Apr 2024 23:15) (92% - 100%)    Parameters below as of 10 Apr 2024 23:15  Patient On (Oxygen Delivery Method): room air    Exam:  General: A&Ox1, NAD, resting comfortably in bed.  Laterality: RLE      Aquacel dressings x2 are clean, dry, and intact       Sensation intact to light touch     (+) DF/PF/EHL/FHL     1+ DP/PT pulses.  Calves soft, nontender bilaterally    Labs: pending    A/P: 89y Male s/p Right Femur IM Nailing  -Pain management  -IS encouraged  -Ice/elevation  -DVT ppx: Lovenox QD, SCDs  -PT/OT: WBAT, OOB  -Continue with Post-op Antibiotics x 24hrs  -f/u AM labs  -Will continue to follow    Filemon Sharma PA-C  Orthopedic Surgery  Pager #[phone]

## 2024-04-10 NOTE — CONSULT NOTE ADULT - NS ATTEND AMEND GEN_ALL_CORE FT
Pt seen and examined with ACP.  Assessment and plan reviewed and discussed.  Agree with above.    History supplemented by son. Status of wounds and treatment recommendations d/w  pt's son (POA, HCP) at bedside at length.  All questions answered.   Son expressed understanding.    I spent  75 minutes face to face w/ this pt of which more than 50% of the time was spent counseling & coordinating care of this pt.

## 2024-04-10 NOTE — PROGRESS NOTE ADULT - ASSESSMENT
89 yo male h/o dementia, htn, cad s/p pci, here after being found down on floor with head trauma and with right hip fracture   also with +trops    right hip fx  ortho eval noted  plan for OR  pt intermediate risk candidate for planned orthopedic surg    +trop  possible nstemi  cards consulted  trend trop  tele  echo noted    cad s/p pci  cards f/u    htn  cont home meds    head lac  s/p sutures  local wound care    dementia  stable  cont Donepezil      PT    Advanced care planning was discussed with patient and family.  Advanced care planning forms were reviewed and discussed as appropriate.  Differential diagnosis and plan of care discussed with patient after the evaluation.   Pain assessed and judicious use of narcotics when appropriate was discussed.  Importance of Fall prevention discussed.  Counseling on Smoking and Alcohol cessation was offered when appropriate.  Counseling on Diet, exercise, and medication compliance was done.       Approx 75 minutes spent.

## 2024-04-10 NOTE — PROGRESS NOTE ADULT - ASSESSMENT
89yMale w/ R IT fx s/p unwitnessed fall     PLAN  -NWB RLE  -Bedrest  -OR planned for today   -NPO except meds  -IVF while NPO   -hold chemical DVT ppx for OR; SCDs OK  -pain control  -ice/cold

## 2024-04-10 NOTE — CONSULT NOTE ADULT - ASSESSMENT
A/P:89 yo male h/o htn, dementia, cad s/p pci, p/w R hip pain after being found down. Pt only A&Ox1 in ED.  History obtained from son Vlad.  Per son pt lives at home with visiting nurse services and was found down outside by neighbor early this AM.  Camera footage suggests patient likely fell.  Was brought to ED and now has R hip pain.  Has been unableto bear weight in the RLE since the fall. Pt unclear regarding events surrounding incident and does not know if he hit his head or lost consciousness but does have a scalp lac. At baseline, pt lives in apartment with help of nursing services and ambulates with a walker/cane    Wound Consult requested to assist w/ management of  Rt middle finger abrasion   Skin tear left dorsal hand      Sacral region MASD,    Rt Forehead wound    Recommendations:   Rt finger; Betadine daily  Skin tear: adaptic  MASD: SHARON BID and PRN  Rt forehead: Plastics discussed with ACP      Consider RONAK/PVR  Moisturize intact skin w/ SWEEN cream BID  Nutrition Consult for optimization in pt w/  Increased nutritional needs            encourage high quality protein, onel/ prosource, MVI & Vit C to promote wound healing  Continue turning and positioning w/ offloading assistive devices as per protocol   Continue w/ attends under pads and Pericare w/  purewick care as per protocol  Waffle Cushion to chair when oob to chair  Continue w/ low air loss pressure redistribution bed surface   Pt will need Group 2 mattress on hospital bed and ROHO cushion for wheel chair upon discharge home  Care as per medicine, will  remain available as requested  Upon discharge f/u as outpatient at [facility] [address] [facility] [phone]  Seen w/ attng & D/w team  Thank you for this consult,  GUILLERMO Simms-BC, [facility]  [phone]  Nights/ Weekends/ Holidays please call:  General Surgery Consult pager ([phone]) for emergencies  Wound PT for multilayer leg wrapping or VAC issues ([phone])  A/P:89 yo male h/o htn, dementia, cad s/p pci, p/w R hip pain after being found down. Pt only A&Ox1 in ED.  History obtained from son Vlad.  Per son pt lives at home with visiting nurse services and was found down outside by neighbor early this AM.  Camera footage suggests patient likely fell.  Was brought to ED and now has R hip pain.  Has been unableto bear weight in the RLE since the fall. Pt unclear regarding events surrounding incident and does not know if he hit his head or lost consciousness but does have a scalp lac. At baseline, pt lives in apartment with help of nursing services and ambulates with a walker/cane    Wound Consult requested to assist w/ management of  Rt middle finger abrasion   Skin tear left dorsal hand      Sacral region Incontinence associated dermatitis   Rt Forehead wound    Recommendations:   Rt finger; Betadine daily  Left hand Skin tear: adaptic  IAD: SHARON BID and PRN  Rt forehead: adaptic.  Rec Plastics discussed with ACP      Consider RONAK/PVR if in line with GOC  Moisturize intact skin w/ SWEEN cream BID  Nutrition Consult for optimization in pt w/  Increased nutritional needs            encourage high quality protein, onel/ prosource, MVI & Vit C to promote wound healing  Continue turning and positioning w/ offloading assistive devices as per protocol   Continue w/ attends under pads and Pericare w/  purewick care as per protocol  Waffle Cushion to chair when oob to chair  Continue w/ low air loss pressure redistribution bed surface   Care as per medicine, will  remain available as requested  Upon discharge f/u as outpatient at [facility] [address] [facility] [phone]  Seen w/ attng & D/w team  Thank you for this consult,  GUILLERMO Simms-BC, [facility]  [phone]  Nights/ Weekends/ Holidays please call:  General Surgery Consult pager ([phone]) for emergencies  Wound PT for multilayer leg wrapping or VAC issues (x [phone])

## 2024-04-10 NOTE — PRE-ANESTHESIA EVALUATION ADULT - NSRADCARDRESULTSFT_GEN_ALL_CORE
TTE 4/9/24: EF 70-75% 1. Left ventricular cavity is small. Left ventricular systolic function is hyperdynamic. Regional wall motion abnormalities present.   2. 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   3. Mild to moderate pulmonary hypertension.

## 2024-04-10 NOTE — PRE-ANESTHESIA EVALUATION ADULT - NSANTHPMHFT_GEN_ALL_CORE
89 y/o male with right hip fx; s/p fall; not know if lost consciousness; scalp laceration; ambulates normally with a walker/cane  HTN on metoprolol; CAD; dementia on aricept 89 y/o male with right hip fx; s/p fall; not know if lost consciousness; scalp laceration; ambulates normally with a walker/cane  HTN on metoprolol; CAD s/p PCI 40 years ago; dementia on aricept

## 2024-04-11 LAB
ANION GAP SERPL CALC-SCNC: 14 MMOL/L — SIGNIFICANT CHANGE UP (ref 5–17)
ANION GAP SERPL CALC-SCNC: 15 MMOL/L — SIGNIFICANT CHANGE UP (ref 5–17)
BASOPHILS # BLD AUTO: 0.01 K/UL — SIGNIFICANT CHANGE UP (ref 0–0.2)
BASOPHILS NFR BLD AUTO: 0.1 % — SIGNIFICANT CHANGE UP (ref 0–2)
BUN SERPL-MCNC: 33 MG/DL — HIGH (ref 7–23)
BUN SERPL-MCNC: 34 MG/DL — HIGH (ref 7–23)
CALCIUM SERPL-MCNC: 7.8 MG/DL — LOW (ref 8.4–10.5)
CALCIUM SERPL-MCNC: 8.4 MG/DL — SIGNIFICANT CHANGE UP (ref 8.4–10.5)
CHLORIDE SERPL-SCNC: 113 MMOL/L — HIGH (ref 96–108)
CHLORIDE SERPL-SCNC: 114 MMOL/L — HIGH (ref 96–108)
CO2 SERPL-SCNC: 16 MMOL/L — LOW (ref 22–31)
CO2 SERPL-SCNC: 18 MMOL/L — LOW (ref 22–31)
CREAT SERPL-MCNC: 1.3 MG/DL — SIGNIFICANT CHANGE UP (ref 0.5–1.3)
CREAT SERPL-MCNC: 1.32 MG/DL — HIGH (ref 0.5–1.3)
EGFR: 49 ML/MIN/1.73M2 — LOW
EGFR: 50 ML/MIN/1.73M2 — LOW
EOSINOPHIL # BLD AUTO: 0.01 K/UL — SIGNIFICANT CHANGE UP (ref 0–0.5)
EOSINOPHIL NFR BLD AUTO: 0.1 % — SIGNIFICANT CHANGE UP (ref 0–6)
GLUCOSE SERPL-MCNC: 116 MG/DL — HIGH (ref 70–99)
GLUCOSE SERPL-MCNC: 122 MG/DL — HIGH (ref 70–99)
HCT VFR BLD CALC: 29 % — LOW (ref 39–50)
HCT VFR BLD CALC: 29.9 % — LOW (ref 39–50)
HGB BLD-MCNC: 10 G/DL — LOW (ref 13–17)
HGB BLD-MCNC: 9.2 G/DL — LOW (ref 13–17)
IMM GRANULOCYTES NFR BLD AUTO: 0.4 % — SIGNIFICANT CHANGE UP (ref 0–0.9)
LYMPHOCYTES # BLD AUTO: 0.25 K/UL — LOW (ref 1–3.3)
LYMPHOCYTES # BLD AUTO: 3.4 % — LOW (ref 13–44)
MCHC RBC-ENTMCNC: 29.1 PG — SIGNIFICANT CHANGE UP (ref 27–34)
MCHC RBC-ENTMCNC: 29.9 PG — SIGNIFICANT CHANGE UP (ref 27–34)
MCHC RBC-ENTMCNC: 31.7 GM/DL — LOW (ref 32–36)
MCHC RBC-ENTMCNC: 33.4 GM/DL — SIGNIFICANT CHANGE UP (ref 32–36)
MCV RBC AUTO: 89.3 FL — SIGNIFICANT CHANGE UP (ref 80–100)
MCV RBC AUTO: 91.8 FL — SIGNIFICANT CHANGE UP (ref 80–100)
MONOCYTES # BLD AUTO: 0.35 K/UL — SIGNIFICANT CHANGE UP (ref 0–0.9)
MONOCYTES NFR BLD AUTO: 4.7 % — SIGNIFICANT CHANGE UP (ref 2–14)
MRSA PCR RESULT.: SIGNIFICANT CHANGE UP
NEUTROPHILS # BLD AUTO: 6.79 K/UL — SIGNIFICANT CHANGE UP (ref 1.8–7.4)
NEUTROPHILS NFR BLD AUTO: 91.3 % — HIGH (ref 43–77)
NRBC # BLD: 0 /100 WBCS — SIGNIFICANT CHANGE UP (ref 0–0)
NRBC # BLD: 0 /100 WBCS — SIGNIFICANT CHANGE UP (ref 0–0)
PLATELET # BLD AUTO: 115 K/UL — LOW (ref 150–400)
PLATELET # BLD AUTO: 120 K/UL — LOW (ref 150–400)
POTASSIUM SERPL-MCNC: 4.6 MMOL/L — SIGNIFICANT CHANGE UP (ref 3.5–5.3)
POTASSIUM SERPL-MCNC: 4.8 MMOL/L — SIGNIFICANT CHANGE UP (ref 3.5–5.3)
POTASSIUM SERPL-SCNC: 4.6 MMOL/L — SIGNIFICANT CHANGE UP (ref 3.5–5.3)
POTASSIUM SERPL-SCNC: 4.8 MMOL/L — SIGNIFICANT CHANGE UP (ref 3.5–5.3)
RBC # BLD: 3.16 M/UL — LOW (ref 4.2–5.8)
RBC # BLD: 3.35 M/UL — LOW (ref 4.2–5.8)
RBC # FLD: 14.8 % — HIGH (ref 10.3–14.5)
RBC # FLD: 15 % — HIGH (ref 10.3–14.5)
S AUREUS DNA NOSE QL NAA+PROBE: SIGNIFICANT CHANGE UP
SODIUM SERPL-SCNC: 144 MMOL/L — SIGNIFICANT CHANGE UP (ref 135–145)
SODIUM SERPL-SCNC: 146 MMOL/L — HIGH (ref 135–145)
WBC # BLD: 7.44 K/UL — SIGNIFICANT CHANGE UP (ref 3.8–10.5)
WBC # BLD: 9.29 K/UL — SIGNIFICANT CHANGE UP (ref 3.8–10.5)
WBC # FLD AUTO: 7.44 K/UL — SIGNIFICANT CHANGE UP (ref 3.8–10.5)
WBC # FLD AUTO: 9.29 K/UL — SIGNIFICANT CHANGE UP (ref 3.8–10.5)

## 2024-04-11 RX ADMIN — Medication 975 MILLIGRAM(S): at 21:46

## 2024-04-11 RX ADMIN — Medication 5 MILLIGRAM(S): at 05:39

## 2024-04-11 RX ADMIN — Medication 975 MILLIGRAM(S): at 13:33

## 2024-04-11 RX ADMIN — Medication 100 MILLIGRAM(S): at 13:33

## 2024-04-11 RX ADMIN — SODIUM CHLORIDE 125 MILLILITER(S): 9 INJECTION INTRAMUSCULAR; INTRAVENOUS; SUBCUTANEOUS at 05:40

## 2024-04-11 RX ADMIN — Medication 975 MILLIGRAM(S): at 05:39

## 2024-04-11 RX ADMIN — Medication 975 MILLIGRAM(S): at 22:11

## 2024-04-11 RX ADMIN — Medication 100 MILLIGRAM(S): at 05:40

## 2024-04-11 RX ADMIN — Medication 975 MILLIGRAM(S): at 06:11

## 2024-04-11 RX ADMIN — SODIUM CHLORIDE 125 MILLILITER(S): 9 INJECTION INTRAMUSCULAR; INTRAVENOUS; SUBCUTANEOUS at 13:49

## 2024-04-11 RX ADMIN — SENNA PLUS 2 TABLET(S): 8.6 TABLET ORAL at 21:45

## 2024-04-11 RX ADMIN — DONEPEZIL HYDROCHLORIDE 10 MILLIGRAM(S): 10 TABLET, FILM COATED ORAL at 21:46

## 2024-04-11 RX ADMIN — Medication 50 MILLIGRAM(S): at 05:39

## 2024-04-11 RX ADMIN — ENOXAPARIN SODIUM 30 MILLIGRAM(S): 100 INJECTION SUBCUTANEOUS at 05:39

## 2024-04-11 RX ADMIN — Medication 975 MILLIGRAM(S): at 14:30

## 2024-04-11 NOTE — PHYSICAL THERAPY INITIAL EVALUATION ADULT - LEVEL OF CONSCIOUSNESS, REHAB EVAL
PER pharmacy would like a refill of med.  PT. last visit  11/19/18.  PT. refill set to E-PRESCRIBE upon approval.        confused

## 2024-04-11 NOTE — OCCUPATIONAL THERAPY INITIAL EVALUATION ADULT - PERTINENT HX OF CURRENT PROBLEM, REHAB EVAL
89 yo male h/o htn, dementia, cad s/p pci, p/w R hip pain after being found down. Pt only A&Ox1 in ED.  History obtained from son Vlad.  Per son pt lives at home with visiting nurse services and was found down outside by neighbor early this AM.  Camera footage suggests patient likely fell.  Was brought to ED and now has R hip pain.  Has been unableto bear weight in the RLE since the fall. Pt unclear regarding events surrounding incident and does not know if he hit his head or lost consciousness but does have a scalp lac. At baseline, pt lives in apartment with help of nursing services and ambulates with a walker/cane. CT pelvis- Acute mildly displaced/varus impacted oblique longitudinal intertrochanteric right proximal femoral fracture. CT head: -No acute intracranial findings. -Right periorbital soft tissue swelling. No fracture identified. CT cervical spine: -No acute fracture or dislocation.-1.8 cm right parotid lesion. Possible left parotid lesion is also noted measuring up to 1.4 cm. Salivary gland neoplasm or metastatic disease is in the differential. Recommend CT neck with IV contrast for more complete   parotid and cervical lymph node imaging. XRAY Hand- Limited evaluation of the distal phalanges due to positioning. Bones are demineralized. No acute fracture appreciated. There is severe left basilar arthrosis. There is bilateral distal interphalangeal arthrosis. No radiopaque foreign bodies visualized. XRay Chest (-).  now s/p Intramedullary nailing of femur 10-Apr-2024

## 2024-04-11 NOTE — CHART NOTE - NSCHARTNOTEFT_GEN_A_CORE
Code status reviewed with patient's son, Juanpablo Chu; implications of DNR/DNI discussed   MOLST completed with health care proxy, Juanpablo Chu; patient is now DNR/DNI   MOLST placed in chart     Jeanie Wade PA-C  Dept of Medicine

## 2024-04-11 NOTE — PHYSICAL THERAPY INITIAL EVALUATION ADULT - TRANSFER TRAINING, PT EVAL
GOAL: Patient will perform sit to stand transfers with min A at rolling walker with proper hand placement

## 2024-04-11 NOTE — OCCUPATIONAL THERAPY INITIAL EVALUATION ADULT - NSOTDISCHREC_GEN_A_CORE
if dc home pt requires assist for all functional mobility & home OT for safety and to increase independence in ADLs and functional tasks./Sub-acute Rehab

## 2024-04-11 NOTE — PHYSICAL THERAPY INITIAL EVALUATION ADULT - PERTINENT HX OF CURRENT PROBLEM, REHAB EVAL
Pt is 89 yo M h/o dementia, htn, cad s/p pci, here after being found down on floor with head trauma and with right hip fracture and is now s/p R IMN.   4/9 XR pelvis - Comminuted displaced right intertrochanteric hip fracture with avulsion of the lesser trochanter. No dislocation. No advanced hip arthrosis. There is external rotation of the distal fracture fragment by approximately 90 degrees. No advanced knee arthrosis. There are vascular calcifications. 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 Pt is 89 yo M h/o dementia, htn, cad s/p pci, here after being found down on floor with head trauma and with right hip fracture and is now s/p R IMN.   4/9 CT head - No acute intracranial findings. R periorbital soft tissue swelling, no fx identified.   4/9 CT C/S - No acute fx or dislocation.   4/9 CT chest - Few scattered sub-.6 pulmonary nodules, nonspecific. Findings may be infection or inflammatory in etiology.   4/9 XR pelvis/R femur - Comminuted displaced right intertrochanteric hip fracture with avulsion of the lesser trochanter. No dislocation. No advanced hip arthrosis. There is external rotation of the distal fracture fragment by approximately 90 degrees. No advanced knee arthrosis. There are vascular calcifications. 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  4/9 CXR - Clear lungs.   4/9 B/L hand XR - Limited evaluation of the distal phalanges due to positioning. Bones are demineralized. No acute fx appreciated. There is severe L basilar arthrosis. There is b/l distal interphalangeal arthrosis. No radiopaque foreign bodies visualized.   4/9 XR R knee - No advanced knee arthrosis, no fx to knee.   4/9 12 Lead ECG - NSR, septal infarct, age undertermined. ST&T wave abnormality, consider inferior ischemia. Abnormal ECG. Pt is 89 yo M h/o dementia, htn, cad s/p pci, here after being found down on floor with head trauma and with right hip fracture and is now s/p R femoral IMN.   4/9 CT head - No acute intracranial findings. R periorbital soft tissue swelling, no fx identified.   4/9 CT C/S - No acute fx or dislocation.   4/9 CT chest - Few scattered sub-.6 pulmonary nodules, nonspecific. Findings may be infection or inflammatory in etiology.   4/9 XR pelvis/R femur - Comminuted displaced right intertrochanteric hip fracture with avulsion of the lesser trochanter. No dislocation. No advanced hip arthrosis. There is external rotation of the distal fracture fragment by approximately 90 degrees. No advanced knee arthrosis. There are vascular calcifications. 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  4/9 CXR - Clear lungs.   4/9 B/L hand XR - Limited evaluation of the distal phalanges due to positioning. Bones are demineralized. No acute fx appreciated. There is severe L basilar arthrosis. There is b/l distal interphalangeal arthrosis. No radiopaque foreign bodies visualized.   4/9 XR R knee - No advanced knee arthrosis, no fx to knee.   4/9 12 Lead ECG - NSR, septal infarct, age undertermined. ST&T wave abnormality, consider inferior ischemia. Abnormal ECG.

## 2024-04-11 NOTE — PROGRESS NOTE ADULT - ASSESSMENT
89 yo male h/o dementia, htn, cad s/p pci, here after being found down on floor with head trauma and with right hip fracture   also with +trops    right hip fx  ortho eval noted  s/p IMN  POD 1  tolerated surg well  post op care per ortho  pain control  PT    +trop  possible nstemi  cards consulted  trend trop  tele  echo noted    cad s/p pci  cards f/u    htn  cont home meds    head lac  s/p sutures  local wound care    dementia  stable  cont Donepezil      PT    dvt ppx      Advanced care planning was discussed with patient and family.  Advanced care planning forms were reviewed and discussed as appropriate.  Differential diagnosis and plan of care discussed with patient after the evaluation.   Pain assessed and judicious use of narcotics when appropriate was discussed.  Importance of Fall prevention discussed.  Counseling on Smoking and Alcohol cessation was offered when appropriate.  Counseling on Diet, exercise, and medication compliance was done.       Approx 75 minutes spent.

## 2024-04-11 NOTE — PROGRESS NOTE ADULT - ASSESSMENT
89y Male s/p Right Femur IM Nailing    PLAN  -WBAT  -Pain management PRN  -IS encouraged  -Ice/elevation  -DVT ppx: Lovenox QD, SCDs  -PT/OT  -Continue with Post-op Antibiotics x 24hrs  -f/u AM labs  -Ortho to follow

## 2024-04-11 NOTE — PHYSICAL THERAPY INITIAL EVALUATION ADULT - NSPTDISCHREC_GEN_A_CORE
If pt d/c home would require home PT, assist with ALL mobility, & DME: RW, 3:1 commode, & w/c./[facility]

## 2024-04-11 NOTE — PHYSICAL THERAPY INITIAL EVALUATION ADULT - ADDITIONAL COMMENTS
Pt jeff ferrellian, per son who was at bedside, pt resides alone in apartment with 2 steps to enter with rail, uses rolling walker and cane intermittently, has HHA 7days/wk 8hrs/day who assists with all mobility and ADL's.

## 2024-04-11 NOTE — OCCUPATIONAL THERAPY INITIAL EVALUATION ADULT - LIVES WITH, PROFILE
pt lives in a private house with 2 steps to enter. pt has HHA 8 hrs a day/7 days a week and remains alone for rest of time. pt was independent in ambulation with use of rolling walker or straight cane.

## 2024-04-11 NOTE — OCCUPATIONAL THERAPY INITIAL EVALUATION ADULT - ASSISTIVE DEVICE FOR TRANSFER: SIT/STAND, REHAB EVAL
May 26, 2023     Patient: Clarence Gomez   YOB: 2012   Date of Visit: 5/26/2023       To Whom it May Concern:    Clarence Gomez was seen in my clinic on 5/26/2023 at 10:30 am.     Please excuse Clarence for his absence from school on the date listed above to be able to make his appointment. No contact sports or activities for 1 week. No riding sports or activities for 1 week.     Sincerely,     Socorro Regan CNP    Medical information is confidential and cannot be disclosed without the written consent of the patient or his representative.     rolling walker

## 2024-04-12 LAB
ANION GAP SERPL CALC-SCNC: 13 MMOL/L — SIGNIFICANT CHANGE UP (ref 5–17)
BUN SERPL-MCNC: 33 MG/DL — HIGH (ref 7–23)
CALCIUM SERPL-MCNC: 8.5 MG/DL — SIGNIFICANT CHANGE UP (ref 8.4–10.5)
CHLORIDE SERPL-SCNC: 114 MMOL/L — HIGH (ref 96–108)
CO2 SERPL-SCNC: 19 MMOL/L — LOW (ref 22–31)
CREAT SERPL-MCNC: 1.25 MG/DL — SIGNIFICANT CHANGE UP (ref 0.5–1.3)
EGFR: 52 ML/MIN/1.73M2 — LOW
GLUCOSE SERPL-MCNC: 82 MG/DL — SIGNIFICANT CHANGE UP (ref 70–99)
HCT VFR BLD CALC: 29 % — LOW (ref 39–50)
HGB BLD-MCNC: 9.1 G/DL — LOW (ref 13–17)
MCHC RBC-ENTMCNC: 28.9 PG — SIGNIFICANT CHANGE UP (ref 27–34)
MCHC RBC-ENTMCNC: 31.4 GM/DL — LOW (ref 32–36)
MCV RBC AUTO: 92.1 FL — SIGNIFICANT CHANGE UP (ref 80–100)
NRBC # BLD: 0 /100 WBCS — SIGNIFICANT CHANGE UP (ref 0–0)
PLATELET # BLD AUTO: 121 K/UL — LOW (ref 150–400)
POTASSIUM SERPL-MCNC: 4.2 MMOL/L — SIGNIFICANT CHANGE UP (ref 3.5–5.3)
POTASSIUM SERPL-SCNC: 4.2 MMOL/L — SIGNIFICANT CHANGE UP (ref 3.5–5.3)
RBC # BLD: 3.15 M/UL — LOW (ref 4.2–5.8)
RBC # FLD: 14.8 % — HIGH (ref 10.3–14.5)
SODIUM SERPL-SCNC: 146 MMOL/L — HIGH (ref 135–145)
WBC # BLD: 6.49 K/UL — SIGNIFICANT CHANGE UP (ref 3.8–10.5)
WBC # FLD AUTO: 6.49 K/UL — SIGNIFICANT CHANGE UP (ref 3.8–10.5)

## 2024-04-12 RX ADMIN — POLYETHYLENE GLYCOL 3350 17 GRAM(S): 17 POWDER, FOR SOLUTION ORAL at 12:15

## 2024-04-12 RX ADMIN — Medication 975 MILLIGRAM(S): at 21:42

## 2024-04-12 RX ADMIN — ENOXAPARIN SODIUM 30 MILLIGRAM(S): 100 INJECTION SUBCUTANEOUS at 05:55

## 2024-04-12 RX ADMIN — DONEPEZIL HYDROCHLORIDE 10 MILLIGRAM(S): 10 TABLET, FILM COATED ORAL at 21:12

## 2024-04-12 RX ADMIN — Medication 50 MILLIGRAM(S): at 05:41

## 2024-04-12 RX ADMIN — Medication 975 MILLIGRAM(S): at 12:15

## 2024-04-12 RX ADMIN — Medication 975 MILLIGRAM(S): at 06:41

## 2024-04-12 RX ADMIN — Medication 5 MILLIGRAM(S): at 05:41

## 2024-04-12 RX ADMIN — Medication 975 MILLIGRAM(S): at 05:41

## 2024-04-12 RX ADMIN — SENNA PLUS 2 TABLET(S): 8.6 TABLET ORAL at 21:13

## 2024-04-12 RX ADMIN — Medication 975 MILLIGRAM(S): at 12:45

## 2024-04-12 RX ADMIN — Medication 975 MILLIGRAM(S): at 21:12

## 2024-04-12 RX ADMIN — Medication 3 MILLIGRAM(S): at 21:13

## 2024-04-12 NOTE — ADVANCED PRACTICE NURSE CONSULT - ASSESSMENT
Patient encountered on [address] Tower. When wound care RN arrived on unit, patient was found sitting in a reclining chair, son present. Patient was alert and gave consent to skin consult. Mr Chu was returned to his room, he is unable to stand independently and staff assistance x 2 was provided. Once standing, urinary incontinence was apparent and pericare was provided. Wound care RN was able to visualize an area of persistent nonblanchable maroon and purple discoloration over B/L buttocks/sacral skin, area measures approximately 5cm x 5cm x 0cm- presentation is consistent with a deep tissue injury with incontinence involvement present on admission. On B/L achilles heels there is hyperpigmentation noted to measure approximately 1cm x 1cm x 0cm, cannot rule out a deep tissue injury present on admission. Once consult was complete, patient was placed back into reclining chair with pillow under patient.

## 2024-04-12 NOTE — PROGRESS NOTE ADULT - ASSESSMENT
A/P: 89 y/o M POD#2 s/p R IM Nail    DVT ppx- Lovenox 30mg SQ Daily  RLE WBAT  PT  OT  Gi ppx  Discharge planning to [facility]  DNR/DNI  FU  labs    KALYANI Lala  Orthopedic Surgery  [id number]

## 2024-04-12 NOTE — ADVANCED PRACTICE NURSE CONSULT - RECOMMEDATIONS
Impression:    B/L buttocks/sacral deep tissue injury present on admission  B/L heel hyperpigmentation, cannot rule out a deep tissue injury present on admission   urinary incontinence   fecal incontinence    Recommendations:     1) turn and position q2 and PRN utilizing offloading assistive devices  2) routine pericare daily and PRN soiling  3) encourage optimal nutrition  4) waffle cushion when oob to chair  5) B/L LE complete cair air fluidized boots or ashley-lock pillow to offload heels/feet  6) triad protective barrier cream to B/L buttocks/sacrum daily and PRN soiling  7) incontinence management - consider external urinary catheter to divert urine from skin if incontinent    Plan discussed with MILLIE Patiño on unit     For questions/comments regarding the recommendations in this consult, please contact Ella at [phone]. Thank you!

## 2024-04-12 NOTE — PROGRESS NOTE ADULT - ASSESSMENT
89 yo male h/o dementia, htn, cad s/p pci, here after being found down on floor with head trauma and with right hip fracture   also with +trops    right hip fx  ortho eval noted  s/p IMN  POD 2  tolerated surg well  post op care per ortho  pain control  PT    +trop  possible nstemi  cards consulted  trend trop  tele  echo noted    cad s/p pci  cards f/u    htn  cont home meds    head lac  s/p sutures  local wound care    dementia  stable  cont Donepezil      PT    dvt ppx      Advanced care planning was discussed with patient and family.  Advanced care planning forms were reviewed and discussed as appropriate.  Differential diagnosis and plan of care discussed with patient after the evaluation.   Pain assessed and judicious use of narcotics when appropriate was discussed.  Importance of Fall prevention discussed.  Counseling on Smoking and Alcohol cessation was offered when appropriate.  Counseling on Diet, exercise, and medication compliance was done.       Approx 75 minutes spent.

## 2024-04-12 NOTE — ADVANCED PRACTICE NURSE CONSULT - REASON FOR CONSULT
Wound care consult initiated by RN to assess patient's skin for a possible sacral deep tissue injury present on admission     History of Present Illness:   89 yo male h/o htn, dementia, cad s/p pci, p/w R hip pain after being found down. Pt only A&Ox1 in ED.  History obtained from son Vlda.  Per son pt lives at home with visiting nurse services and was found down outside by neighbor early this AM.  Camera footage suggests patient likely fell.  Was brought to ED and now has R hip pain.  Has been unableto bear weight in the RLE since the fall. Pt unclear regarding events surrounding incident and does not know if he hit his head or lost consciousness but does have a scalp lac. At baseline, pt lives in apartment with help of nursing services and ambulates with a walker/cane

## 2024-04-13 RX ORDER — SODIUM CHLORIDE 9 MG/ML
1000 INJECTION INTRAMUSCULAR; INTRAVENOUS; SUBCUTANEOUS
Refills: 0 | Status: DISCONTINUED | OUTPATIENT
Start: 2024-04-13 | End: 2024-04-16

## 2024-04-13 RX ADMIN — DONEPEZIL HYDROCHLORIDE 10 MILLIGRAM(S): 10 TABLET, FILM COATED ORAL at 21:35

## 2024-04-13 RX ADMIN — Medication 5 MILLIGRAM(S): at 05:35

## 2024-04-13 RX ADMIN — Medication 975 MILLIGRAM(S): at 14:47

## 2024-04-13 RX ADMIN — Medication 975 MILLIGRAM(S): at 22:15

## 2024-04-13 RX ADMIN — Medication 975 MILLIGRAM(S): at 06:08

## 2024-04-13 RX ADMIN — ENOXAPARIN SODIUM 30 MILLIGRAM(S): 100 INJECTION SUBCUTANEOUS at 05:40

## 2024-04-13 RX ADMIN — Medication 50 MILLIGRAM(S): at 05:35

## 2024-04-13 RX ADMIN — OXYCODONE HYDROCHLORIDE 2.5 MILLIGRAM(S): 5 TABLET ORAL at 10:20

## 2024-04-13 RX ADMIN — SODIUM CHLORIDE 75 MILLILITER(S): 9 INJECTION INTRAMUSCULAR; INTRAVENOUS; SUBCUTANEOUS at 21:36

## 2024-04-13 RX ADMIN — Medication 975 MILLIGRAM(S): at 05:35

## 2024-04-13 RX ADMIN — OXYCODONE HYDROCHLORIDE 2.5 MILLIGRAM(S): 5 TABLET ORAL at 11:20

## 2024-04-13 RX ADMIN — Medication 975 MILLIGRAM(S): at 13:47

## 2024-04-13 RX ADMIN — Medication 975 MILLIGRAM(S): at 21:35

## 2024-04-13 RX ADMIN — POLYETHYLENE GLYCOL 3350 17 GRAM(S): 17 POWDER, FOR SOLUTION ORAL at 13:47

## 2024-04-13 NOTE — PROGRESS NOTE ADULT - ASSESSMENT
89 yo male h/o dementia, htn, cad s/p pci, here after being found down on floor with head trauma and with right hip fracture   also with +trops    right hip fx  ortho eval noted  s/p IMN  POD 3  tolerated surg well  post op care per ortho  pain control  PT    +trop  possible nstemi  cards consulted  trend trop  tele  echo noted    cad s/p pci  cards f/u    htn  cont home meds    head lac  s/p sutures  local wound care    dementia  stable  cont Donepezil      PT    dvt ppx      Advanced care planning was discussed with patient and family.  Advanced care planning forms were reviewed and discussed as appropriate.  Differential diagnosis and plan of care discussed with patient after the evaluation.   Pain assessed and judicious use of narcotics when appropriate was discussed.  Importance of Fall prevention discussed.  Counseling on Smoking and Alcohol cessation was offered when appropriate.  Counseling on Diet, exercise, and medication compliance was done.       Approx 75 minutes spent.

## 2024-04-14 LAB
BLD GP AB SCN SERPL QL: NEGATIVE — SIGNIFICANT CHANGE UP
HCT VFR BLD CALC: 25.9 % — LOW (ref 39–50)
HGB BLD-MCNC: 8.2 G/DL — LOW (ref 13–17)
MCHC RBC-ENTMCNC: 29.3 PG — SIGNIFICANT CHANGE UP (ref 27–34)
MCHC RBC-ENTMCNC: 31.7 GM/DL — LOW (ref 32–36)
MCV RBC AUTO: 92.5 FL — SIGNIFICANT CHANGE UP (ref 80–100)
NRBC # BLD: 0 /100 WBCS — SIGNIFICANT CHANGE UP (ref 0–0)
PLATELET # BLD AUTO: 172 K/UL — SIGNIFICANT CHANGE UP (ref 150–400)
RBC # BLD: 2.8 M/UL — LOW (ref 4.2–5.8)
RBC # FLD: 14.9 % — HIGH (ref 10.3–14.5)
RH IG SCN BLD-IMP: NEGATIVE — SIGNIFICANT CHANGE UP
WBC # BLD: 5.67 K/UL — SIGNIFICANT CHANGE UP (ref 3.8–10.5)
WBC # FLD AUTO: 5.67 K/UL — SIGNIFICANT CHANGE UP (ref 3.8–10.5)

## 2024-04-14 RX ADMIN — Medication 975 MILLIGRAM(S): at 13:07

## 2024-04-14 RX ADMIN — Medication 5 MILLIGRAM(S): at 06:17

## 2024-04-14 RX ADMIN — Medication 975 MILLIGRAM(S): at 06:17

## 2024-04-14 RX ADMIN — ENOXAPARIN SODIUM 30 MILLIGRAM(S): 100 INJECTION SUBCUTANEOUS at 06:17

## 2024-04-14 RX ADMIN — DONEPEZIL HYDROCHLORIDE 10 MILLIGRAM(S): 10 TABLET, FILM COATED ORAL at 22:22

## 2024-04-14 RX ADMIN — Medication 975 MILLIGRAM(S): at 07:04

## 2024-04-14 RX ADMIN — Medication 50 MILLIGRAM(S): at 06:17

## 2024-04-14 RX ADMIN — Medication 975 MILLIGRAM(S): at 14:07

## 2024-04-14 RX ADMIN — Medication 975 MILLIGRAM(S): at 22:22

## 2024-04-14 RX ADMIN — Medication 975 MILLIGRAM(S): at 23:22

## 2024-04-14 RX ADMIN — SENNA PLUS 2 TABLET(S): 8.6 TABLET ORAL at 22:22

## 2024-04-14 NOTE — PROGRESS NOTE ADULT - ASSESSMENT
A/P: 89 y/o M POD#4  s/p R IM Nail    DVT ppx- Lovenox 30mg SQ Daily  RLE WBAT  PT  OT  Gi ppx  Discharge planning to [facility] [place]  DNR/DNI  [facility] pend [facility]    KALYANI Lala  Orthopedic Surgery  [phone]

## 2024-04-14 NOTE — PROGRESS NOTE ADULT - ASSESSMENT
89 yo male h/o dementia, htn, cad s/p pci, here after being found down on floor with head trauma and with right hip fracture   also with +trops    right hip fx  ortho eval noted  s/p IMN  POD 4  tolerated surg well  post op care per ortho  pain control  PT    post op anemia  likely 2/2 blood loss during surg  transfuse 1 unit prbc    +trop  possible nstemi  cards consulted  trend trop  tele  echo noted    cad s/p pci  cards f/u    htn  cont home meds    head lac  s/p sutures  local wound care    dementia  stable  cont Donepezil      PT    dvt ppx      Advanced care planning was discussed with patient and family.  Advanced care planning forms were reviewed and discussed as appropriate.  Differential diagnosis and plan of care discussed with patient after the evaluation.   Pain assessed and judicious use of narcotics when appropriate was discussed.  Importance of Fall prevention discussed.  Counseling on Smoking and Alcohol cessation was offered when appropriate.  Counseling on Diet, exercise, and medication compliance was done.       Approx 75 minutes spent.

## 2024-04-15 LAB
ANION GAP SERPL CALC-SCNC: 16 MMOL/L — SIGNIFICANT CHANGE UP (ref 5–17)
BUN SERPL-MCNC: 32 MG/DL — HIGH (ref 7–23)
CALCIUM SERPL-MCNC: 8.5 MG/DL — SIGNIFICANT CHANGE UP (ref 8.4–10.5)
CHLORIDE SERPL-SCNC: 117 MMOL/L — HIGH (ref 96–108)
CO2 SERPL-SCNC: 15 MMOL/L — LOW (ref 22–31)
CREAT SERPL-MCNC: 1.22 MG/DL — SIGNIFICANT CHANGE UP (ref 0.5–1.3)
EGFR: 54 ML/MIN/1.73M2 — LOW
GLUCOSE SERPL-MCNC: 98 MG/DL — SIGNIFICANT CHANGE UP (ref 70–99)
HCT VFR BLD CALC: 28.6 % — LOW (ref 39–50)
HGB BLD-MCNC: 9.1 G/DL — LOW (ref 13–17)
MCHC RBC-ENTMCNC: 28.8 PG — SIGNIFICANT CHANGE UP (ref 27–34)
MCHC RBC-ENTMCNC: 31.8 GM/DL — LOW (ref 32–36)
MCV RBC AUTO: 90.5 FL — SIGNIFICANT CHANGE UP (ref 80–100)
NRBC # BLD: 0 /100 WBCS — SIGNIFICANT CHANGE UP (ref 0–0)
PLATELET # BLD AUTO: 199 K/UL — SIGNIFICANT CHANGE UP (ref 150–400)
POTASSIUM SERPL-MCNC: 4.1 MMOL/L — SIGNIFICANT CHANGE UP (ref 3.5–5.3)
POTASSIUM SERPL-SCNC: 4.1 MMOL/L — SIGNIFICANT CHANGE UP (ref 3.5–5.3)
RBC # BLD: 3.16 M/UL — LOW (ref 4.2–5.8)
RBC # FLD: 16 % — HIGH (ref 10.3–14.5)
SODIUM SERPL-SCNC: 148 MMOL/L — HIGH (ref 135–145)
WBC # BLD: 7.38 K/UL — SIGNIFICANT CHANGE UP (ref 3.8–10.5)
WBC # FLD AUTO: 7.38 K/UL — SIGNIFICANT CHANGE UP (ref 3.8–10.5)

## 2024-04-15 RX ADMIN — Medication 975 MILLIGRAM(S): at 05:40

## 2024-04-15 RX ADMIN — Medication 975 MILLIGRAM(S): at 22:00

## 2024-04-15 RX ADMIN — Medication 5 MILLIGRAM(S): at 05:42

## 2024-04-15 RX ADMIN — POLYETHYLENE GLYCOL 3350 17 GRAM(S): 17 POWDER, FOR SOLUTION ORAL at 13:40

## 2024-04-15 RX ADMIN — SENNA PLUS 2 TABLET(S): 8.6 TABLET ORAL at 21:01

## 2024-04-15 RX ADMIN — ENOXAPARIN SODIUM 30 MILLIGRAM(S): 100 INJECTION SUBCUTANEOUS at 05:40

## 2024-04-15 RX ADMIN — Medication 975 MILLIGRAM(S): at 06:40

## 2024-04-15 RX ADMIN — Medication 975 MILLIGRAM(S): at 14:40

## 2024-04-15 RX ADMIN — Medication 975 MILLIGRAM(S): at 21:01

## 2024-04-15 RX ADMIN — Medication 975 MILLIGRAM(S): at 13:40

## 2024-04-15 RX ADMIN — Medication 50 MILLIGRAM(S): at 05:42

## 2024-04-15 RX ADMIN — DONEPEZIL HYDROCHLORIDE 10 MILLIGRAM(S): 10 TABLET, FILM COATED ORAL at 21:01

## 2024-04-15 NOTE — PROVIDER CONTACT NOTE (OTHER) - ASSESSMENT
A & Ox1, reorientation provided. Pt VSS except BP, AT 0500 /60 HR 84, at 0530 /76, HR 96. Pt has no complaints of pain. Pt voiding & hydrating adequately. Pts ecchymosis on arms seems to be getting worse/more extensive, than in the beginning of the shift. Pt has no other complaints. No other concerns at this time.

## 2024-04-15 NOTE — PROGRESS NOTE ADULT - ASSESSMENT
89 y/o M s/p R IM Nail now POD 5 recovering well on floor    PLAN  WBAT  Pain control PRN  DVT ppx- Lovenox 30mg SQ Daily  PT/OT  Dispo: [facility]  Continue monitoring CBC

## 2024-04-15 NOTE — PROGRESS NOTE ADULT - ASSESSMENT
89 yo male h/o dementia, htn, cad s/p pci, here after being found down on floor with head trauma and with right hip fracture   also with +trops    right hip fx  ortho eval noted  s/p IMN  POD 5  tolerated surg well  post op care per ortho  pain control  PT    post op anemia  likely 2/2 blood loss during surg  s/p  1 unit prbc    +trop  possible nstemi  cards consulted  trend trop  tele  echo noted    cad s/p pci  cards f/u    htn  cont home meds    head lac  s/p sutures  local wound care    dementia  stable  cont Donepezil      PT    dvt ppx    dc planning      Advanced care planning was discussed with patient and family.  Advanced care planning forms were reviewed and discussed as appropriate.  Differential diagnosis and plan of care discussed with patient after the evaluation.   Pain assessed and judicious use of narcotics when appropriate was discussed.  Importance of Fall prevention discussed.  Counseling on Smoking and Alcohol cessation was offered when appropriate.  Counseling on Diet, exercise, and medication compliance was done.       Approx 75 minutes spent.

## 2024-04-16 VITALS
SYSTOLIC BLOOD PRESSURE: 150 MMHG | DIASTOLIC BLOOD PRESSURE: 60 MMHG | OXYGEN SATURATION: 94 % | HEART RATE: 81 BPM | RESPIRATION RATE: 18 BRPM | TEMPERATURE: 98 F

## 2024-04-16 LAB — SARS-COV-2 RNA SPEC QL NAA+PROBE: SIGNIFICANT CHANGE UP

## 2024-04-16 PROCEDURE — 97116 GAIT TRAINING THERAPY: CPT

## 2024-04-16 PROCEDURE — 86985 SPLIT BLOOD OR PRODUCTS: CPT

## 2024-04-16 PROCEDURE — 82550 ASSAY OF CK (CPK): CPT

## 2024-04-16 PROCEDURE — 80048 BASIC METABOLIC PNL TOTAL CA: CPT

## 2024-04-16 PROCEDURE — 87635 SARS-COV-2 COVID-19 AMP PRB: CPT

## 2024-04-16 PROCEDURE — 85730 THROMBOPLASTIN TIME PARTIAL: CPT

## 2024-04-16 PROCEDURE — 84484 ASSAY OF TROPONIN QUANT: CPT

## 2024-04-16 PROCEDURE — 97166 OT EVAL MOD COMPLEX 45 MIN: CPT

## 2024-04-16 PROCEDURE — 71045 X-RAY EXAM CHEST 1 VIEW: CPT

## 2024-04-16 PROCEDURE — 73502 X-RAY EXAM HIP UNI 2-3 VIEWS: CPT

## 2024-04-16 PROCEDURE — 76377 3D RENDER W/INTRP POSTPROCES: CPT

## 2024-04-16 PROCEDURE — 82947 ASSAY GLUCOSE BLOOD QUANT: CPT

## 2024-04-16 PROCEDURE — 87640 STAPH A DNA AMP PROBE: CPT

## 2024-04-16 PROCEDURE — 83690 ASSAY OF LIPASE: CPT

## 2024-04-16 PROCEDURE — 85018 HEMOGLOBIN: CPT

## 2024-04-16 PROCEDURE — 82803 BLOOD GASES ANY COMBINATION: CPT

## 2024-04-16 PROCEDURE — 86900 BLOOD TYPING SEROLOGIC ABO: CPT

## 2024-04-16 PROCEDURE — 82962 GLUCOSE BLOOD TEST: CPT

## 2024-04-16 PROCEDURE — 87086 URINE CULTURE/COLONY COUNT: CPT

## 2024-04-16 PROCEDURE — 72192 CT PELVIS W/O DYE: CPT | Mod: MC

## 2024-04-16 PROCEDURE — 12011 RPR F/E/E/N/L/M 2.5 CM/<: CPT

## 2024-04-16 PROCEDURE — 72170 X-RAY EXAM OF PELVIS: CPT

## 2024-04-16 PROCEDURE — 80307 DRUG TEST PRSMV CHEM ANLYZR: CPT

## 2024-04-16 PROCEDURE — 81001 URINALYSIS AUTO W/SCOPE: CPT

## 2024-04-16 PROCEDURE — 83605 ASSAY OF LACTIC ACID: CPT

## 2024-04-16 PROCEDURE — 82435 ASSAY OF BLOOD CHLORIDE: CPT

## 2024-04-16 PROCEDURE — 86901 BLOOD TYPING SEROLOGIC RH(D): CPT

## 2024-04-16 PROCEDURE — 99285 EMERGENCY DEPT VISIT HI MDM: CPT | Mod: 25

## 2024-04-16 PROCEDURE — 85027 COMPLETE CBC AUTOMATED: CPT

## 2024-04-16 PROCEDURE — 85025 COMPLETE CBC W/AUTO DIFF WBC: CPT

## 2024-04-16 PROCEDURE — 70450 CT HEAD/BRAIN W/O DYE: CPT | Mod: MC

## 2024-04-16 PROCEDURE — 76000 FLUOROSCOPY <1 HR PHYS/QHP: CPT

## 2024-04-16 PROCEDURE — 97162 PT EVAL MOD COMPLEX 30 MIN: CPT

## 2024-04-16 PROCEDURE — 85014 HEMATOCRIT: CPT

## 2024-04-16 PROCEDURE — 73560 X-RAY EXAM OF KNEE 1 OR 2: CPT

## 2024-04-16 PROCEDURE — C1713: CPT

## 2024-04-16 PROCEDURE — C8924: CPT

## 2024-04-16 PROCEDURE — 74177 CT ABD & PELVIS W/CONTRAST: CPT | Mod: MC

## 2024-04-16 PROCEDURE — 82330 ASSAY OF CALCIUM: CPT

## 2024-04-16 PROCEDURE — 73552 X-RAY EXAM OF FEMUR 2/>: CPT

## 2024-04-16 PROCEDURE — 12001 RPR S/N/AX/GEN/TRNK 2.5CM/<: CPT

## 2024-04-16 PROCEDURE — 85610 PROTHROMBIN TIME: CPT

## 2024-04-16 PROCEDURE — 86923 COMPATIBILITY TEST ELECTRIC: CPT

## 2024-04-16 PROCEDURE — 84132 ASSAY OF SERUM POTASSIUM: CPT

## 2024-04-16 PROCEDURE — P9011: CPT

## 2024-04-16 PROCEDURE — 36430 TRANSFUSION BLD/BLD COMPNT: CPT

## 2024-04-16 PROCEDURE — 90715 TDAP VACCINE 7 YRS/> IM: CPT

## 2024-04-16 PROCEDURE — 87641 MR-STAPH DNA AMP PROBE: CPT

## 2024-04-16 PROCEDURE — 86850 RBC ANTIBODY SCREEN: CPT

## 2024-04-16 PROCEDURE — 80053 COMPREHEN METABOLIC PANEL: CPT

## 2024-04-16 PROCEDURE — 73120 X-RAY EXAM OF HAND: CPT

## 2024-04-16 PROCEDURE — P9016: CPT

## 2024-04-16 PROCEDURE — 84295 ASSAY OF SERUM SODIUM: CPT

## 2024-04-16 PROCEDURE — 97110 THERAPEUTIC EXERCISES: CPT

## 2024-04-16 PROCEDURE — 72125 CT NECK SPINE W/O DYE: CPT | Mod: MC

## 2024-04-16 PROCEDURE — 36415 COLL VENOUS BLD VENIPUNCTURE: CPT

## 2024-04-16 PROCEDURE — 93321 DOPPLER ECHO F-UP/LMTD STD: CPT

## 2024-04-16 PROCEDURE — 71260 CT THORAX DX C+: CPT | Mod: MC

## 2024-04-16 RX ORDER — SENNA PLUS 8.6 MG/1
2 TABLET ORAL
Qty: 0 | Refills: 0 | DISCHARGE
Start: 2024-04-16

## 2024-04-16 RX ORDER — OXYCODONE HYDROCHLORIDE 5 MG/1
1 TABLET ORAL
Qty: 0 | Refills: 0 | DISCHARGE
Start: 2024-04-16

## 2024-04-16 RX ORDER — LANOLIN ALCOHOL/MO/W.PET/CERES
1 CREAM (GRAM) TOPICAL
Qty: 0 | Refills: 0 | DISCHARGE
Start: 2024-04-16

## 2024-04-16 RX ORDER — ACETAMINOPHEN 500 MG
3 TABLET ORAL
Qty: 0 | Refills: 0 | DISCHARGE
Start: 2024-04-16

## 2024-04-16 RX ORDER — ENOXAPARIN SODIUM 100 MG/ML
30 INJECTION SUBCUTANEOUS
Qty: 1 | Refills: 0
Start: 2024-04-16 | End: 2024-04-16

## 2024-04-16 RX ORDER — OXYCODONE HYDROCHLORIDE 5 MG/1
0.5 TABLET ORAL
Qty: 1 | Refills: 0
Start: 2024-04-16

## 2024-04-16 RX ORDER — POLYETHYLENE GLYCOL 3350 17 G/17G
17 POWDER, FOR SOLUTION ORAL
Qty: 0 | Refills: 0 | DISCHARGE
Start: 2024-04-16

## 2024-04-16 RX ADMIN — Medication 975 MILLIGRAM(S): at 14:44

## 2024-04-16 RX ADMIN — Medication 975 MILLIGRAM(S): at 05:03

## 2024-04-16 RX ADMIN — Medication 50 MILLIGRAM(S): at 05:03

## 2024-04-16 RX ADMIN — ENOXAPARIN SODIUM 30 MILLIGRAM(S): 100 INJECTION SUBCUTANEOUS at 05:03

## 2024-04-16 RX ADMIN — Medication 975 MILLIGRAM(S): at 06:03

## 2024-04-16 RX ADMIN — Medication 5 MILLIGRAM(S): at 05:03

## 2024-04-16 NOTE — PROGRESS NOTE ADULT - PROBLEM SELECTOR PROBLEM 1
Fracture of right hip

## 2024-04-16 NOTE — DISCHARGE NOTE PROVIDER - CARE PROVIDER_API CALL
John Baron  Orthopaedic Surgery  [address] [place] [address], [facility] [address]  [place] [address]  Phone: [phone]  Fax: [phone]  Follow Up Time:

## 2024-04-16 NOTE — PROGRESS NOTE ADULT - PROVIDER SPECIALTY LIST ADULT
Cardiology
Internal Medicine
Internal Medicine
Orthopedics
Cardiology
Cardiology
Internal Medicine
Orthopedics
Cardiology
Internal Medicine
Orthopedics
Cardiology

## 2024-04-16 NOTE — DISCHARGE NOTE NURSING/CASE MANAGEMENT/SOCIAL WORK - PATIENT PORTAL LINK FT
You can access the FollowMyHealth Patient Portal offered by [facility] by registering at the following website: [email or web address]. By joining [facility]’s FollowMyHealth portal, you will also be able to view your health information using other applications (apps) compatible with our system.

## 2024-04-16 NOTE — DISCHARGE NOTE PROVIDER - NSDCCPCAREPLAN_GEN_ALL_CORE_FT
PRINCIPAL DISCHARGE DIAGNOSIS  Diagnosis: Fall  Assessment and Plan of Treatment: Due to fall you sustained  a Right Femur fracture. You required surgery to repair it  (IM Nailing).   Causes of fall may be due to illness, change in the medicines you take, unsafe or unfamiliar setting and conditions that affect eyesight, hearing, muscle strength, or balance.                                                            Certain medicines used for sleep, anxiety, or depression can cause falls. Adding new medicines, or changing doses of some medicines, can also affect your risk of falling. Your doctor might switch you to a different medicine.                                        Prevent falls by make your home safer – To avoid falling at home, get rid of things that might make you trip or slip. This might include furniture, electrical cords, clutter, and loose rugs. Keep your home well lit to avoid falls or accidents. Avoid storing things in high places so you don't have to reach or climb.                             Wear sturdy shoes that fit well – Wearing shoes with high heels or slippery soles, or shoes that are too loose, can lead to falls.                                                                                                                       Take vitamin D pills which might lower the risk of falls in older people. This is because vitamin D helps make bones and muscles stronger.                                                                                                                   Use a cane, walker, and other safety devices as these devices help you avoid falling, too. These include grab bars or a sturdy seat for the shower, non-slip bath mats, and hand rails or treads for the stairs (to prevent slipping). If you worry that you could fall, there are also alarm buttons that let you call for help if you fall and can't get up.   It is important to tell your doctor about any times you have fallen or almost fallen.  Seek immediate help for pain or injury after a fall.        SECONDARY DISCHARGE DIAGNOSES  Diagnosis: Fracture of right hip  Assessment and Plan of Treatment: You required surgery to fix the fracture.   Continue to weight bear as tolerated (WBAT)  Pain control as needed   You will be discharge to rehab for strenght training.   Please follow up with [facility] after rehab     PRINCIPAL DISCHARGE DIAGNOSIS  Diagnosis: Fall  Assessment and Plan of Treatment: Due to fall you sustained  a Right Femur fracture. You required surgery to repair it  (IM Nailing).   Causes of fall may be due to illness, change in the medicines you take, unsafe or unfamiliar setting and conditions that affect eyesight, hearing, muscle strength, or balance.                                                            Certain medicines used for sleep, anxiety, or depression can cause falls. Adding new medicines, or changing doses of some medicines, can also affect your risk of falling. Your doctor might switch you to a different medicine.                                        Prevent falls by make your home safer – To avoid falling at home, get rid of things that might make you trip or slip. This might include furniture, electrical cords, clutter, and loose rugs. Keep your home well lit to avoid falls or accidents. Avoid storing things in high places so you don't have to reach or climb.                             Wear sturdy shoes that fit well – Wearing shoes with high heels or slippery soles, or shoes that are too loose, can lead to falls.                                                                                                                       Take vitamin D pills which might lower the risk of falls in older people. This is because vitamin D helps make bones and muscles stronger.                                                                                                                   Use a cane, walker, and other safety devices as these devices help you avoid falling, too. These include grab bars or a sturdy seat for the shower, non-slip bath mats, and hand rails or treads for the stairs (to prevent slipping). If you worry that you could fall, there are also alarm buttons that let you call for help if you fall and can't get up.        SECONDARY DISCHARGE DIAGNOSES  Diagnosis: Fracture of right hip  Assessment and Plan of Treatment: You required surgery to fix the fracture.   Continue to weight bear as tolerated (WBAT)  Pain control as needed   You will be discharge to rehab for strenght training.   Please follow up with [facility] after rehab  Follow up with dr Baron in 2 weeks or after discharge from rehab. Keep aquacel clean/dry/intact, sponge bathe until follow up or shower with assistance. Remove dressing and sutures/staples POD #14 (04/24/2024) and apply steri strips.

## 2024-04-16 NOTE — DISCHARGE NOTE NURSING/CASE MANAGEMENT/SOCIAL WORK - NSDCPEFALRISK_GEN_ALL_CORE
For information on Fall & Injury Prevention, visit: [email or web address] OR  [email or web address] OR  https://www.cdc.gov/steadi/patient.html

## 2024-04-16 NOTE — PROGRESS NOTE ADULT - SUBJECTIVE AND OBJECTIVE BOX
ORTHOPAEDIC PROGRESS NOTE    SUBJECTIVE:  Pt seen and examined at bedside this am.  Doing well.  No acute events overnight.      OBJECTIVE:  Vital Signs Last 24 Hrs  T(C): 36.9 (10 Apr 2024 05:14), Max: 36.9 (09 Apr 2024 19:30)  T(F): 98.5 (10 Apr 2024 05:14), Max: 98.5 (10 Apr 2024 05:14)  HR: 99 (10 Apr 2024 05:14) (85 - 100)  BP: 118/67 (10 Apr 2024 05:14) (94/58 - 152/77)  BP(mean): 102 (09 Apr 2024 12:41) (70 - 102)  RR: 18 (10 Apr 2024 05:14) (17 - 24)  SpO2: 96% (10 Apr 2024 05:14) (92% - 98%)    Parameters below as of 10 Apr 2024 05:14  Patient On (Oxygen Delivery Method): room air        Physical Exam:  General: NAD; resting comfrotably in bed  Resp: non labored  RLE:  Skin intact, shortened and externally rotated  No appreciable mihir and no ecchymosis over R hip  +TTP over R hip, no TTP along remainder of extremity; compartments soft  Limited ROM at hip 2/2 pain  +Log roll test  +Pain with axial loading  Motor: TA/EHL/GS/FHL intact  Sensory: DP/SP/Tib/Melania/Saph SILT  +DP pulse, WWP    LABS                        10.4   7.71  )-----------( 153      ( 10 Apr 2024 02:50 )             31.5       04-10    142  |  109<H>  |  32<H>  ----------------------------<  133<H>  4.9   |  21<L>  |  1.57<H>    Ca    8.8      10 Apr 2024 02:50    TPro  5.5<L>  /  Alb  3.2<L>  /  TBili  0.6  /  DBili  x   /  AST  12  /  ALT  11  /  AlkPhos  73  04-09      PT/INR - ( 10 Apr 2024 02:50 )   PT: 12.6 sec;   INR: 1.15 ratio         PTT - ( 10 Apr 2024 02:50 )  PTT:29.3 sec    I&O's Summary        
Patient resting without complaints.  No chest pain, SOB, N/V.    T(C): 36.7 (04-12-24 @ 04:42), Max: 36.9 (04-11-24 @ 20:49)  HR: 87 (04-12-24 @ 04:42) (72 - 111)  BP: 133/70 (04-12-24 @ 04:42) (95/55 - 133/70)  RR: 18 (04-12-24 @ 04:42) (18 - 18)  SpO2: 96% (04-12-24 @ 04:42) (93% - 99%)      Exam:  Gen: Awake, [place], confused, pleasant, follows commands  Lower Extremities: RLE: DSGS C/D/I, compartments soft, dull sensation grossly inact, DP1+,   Toes mobile   Calves Soft, Non-tender bilaterally      Labs:                          9.1    6.49  )-----------( 121      ( 12 Apr 2024 06:37 )             29.0    04-12    146<H>  |  114<H>  |  33<H>  ----------------------------<  82  4.2   |  19<L>  |  1.25    Ca    8.5      12 Apr 2024 06:37    
Subjective: Patient seen and examined. No new events except as noted.     REVIEW OF SYSTEMS:  Unable to obtain      MEDICATIONS:  MEDICATIONS  (STANDING):  acetaminophen     Tablet .. 975 milliGRAM(s) Oral every 8 hours  donepezil 10 milliGRAM(s) Oral at bedtime  enalapril 5 milliGRAM(s) Oral daily  enoxaparin Injectable 30 milliGRAM(s) SubCutaneous every 24 hours  metoprolol tartrate 50 milliGRAM(s) Oral daily  polyethylene glycol 3350 17 Gram(s) Oral daily  senna 2 Tablet(s) Oral at bedtime  sodium chloride 0.9%. 1000 milliLiter(s) (75 mL/Hr) IV Continuous <Continuous>  sodium chloride 0.9%. 1000 milliLiter(s) (125 mL/Hr) IV Continuous <Continuous>      PHYSICAL EXAM:  T(C): 36.6 (04-15-24 @ 07:50), Max: 37.1 (04-14-24 @ 21:15)  HR: 73 (04-15-24 @ 07:50) (73 - 96)  BP: 151/69 (04-15-24 @ 07:50) (102/60 - 151/69)  RR: 18 (04-15-24 @ 07:50) (18 - 18)  SpO2: 94% (04-15-24 @ 07:50) (94% - 99%)  Wt(kg): --  I&O's Summary    14 Apr 2024 07:01  -  15 Apr 2024 07:00  --------------------------------------------------------  IN: 720 mL / OUT: 0 mL / NET: 720 mL        Appearance: NAD Right forehead hematoma   HEENT:   Normal oral mucosa, PERRL, EOMI	  Lymphatic: No lymphadenopathy  Cardiovascular: Normal S1 S2, No JVD, No murmurs, No edema  Respiratory: Decreased bs   Psychiatry: A & O x 2  Gastrointestinal:  Soft, Non-tender, + BS	  Skin: No rashes, No ecchymoses, No cyanosis	  Neurologic: Non-focal  RLE  Aquacel dressings x2 c/d/i  Sensation intact to light touch  (+) DF/PF/EHL/FHL  1+ DP/PT pulses.  Calves soft, nontender bilaterall        LABS:    CARDIAC MARKERS:                                8.2    5.67  )-----------( 172      ( 14 Apr 2024 14:33 )             25.9               TELEMETRY: 	    ECG:  	  RADIOLOGY:   DIAGNOSTIC TESTING:  [ ] Echocardiogram:  [ ]  Catheterization:  [ ] Stress Test:    OTHER: 	          
TOMASA FUENTES  89y Male  MRN:[id number]    Patient is a 89y old  Male who presents with a chief complaint of fall    HPI:  89 yo male h/o htn, dementia, cad s/p pci, p/w R hip pain after being found down. Pt only A&Ox1 in ED.  History obtained from son Vlad.  Per son pt lives at home with visiting nurse services and was found down outside by neighbor early this AM.  Camera footage suggests patient likely fell.  Was brought to ED and now has R hip pain.  Has been unableto bear weight in the RLE since the fall. Pt unclear regarding events surrounding incident and does not know if he hit his head or lost consciousness but does have a scalp lac. At baseline, pt lives in apartment with help of nursing services and ambulates with a walker/cane (09 Apr 2024 14:43)      Patient seen and evaluated at bedside. No acute events overnight except as noted.    Interval HPI: no acute events o/n     PAST MEDICAL & SURGICAL HISTORY:  CAD (coronary atherosclerotic disease)      Dementia      Hypertension          REVIEW OF SYSTEMS:  as per hpi        Vital Signs Last 24 Hrs  T(C): 36.7 (12 Apr 2024 04:42), Max: 36.9 (11 Apr 2024 20:49)  T(F): 98 (12 Apr 2024 04:42), Max: 98.5 (11 Apr 2024 20:49)  HR: 90 (12 Apr 2024 08:50) (87 - 111)  BP: 110/67 (12 Apr 2024 08:50) (110/67 - 133/70)  BP(mean): --  RR: 18 (12 Apr 2024 08:50) (18 - 18)  SpO2: 95% (12 Apr 2024 08:50) (93% - 99%)    Parameters below as of 12 Apr 2024 08:50  Patient On (Oxygen Delivery Method): room air          PHYSICAL EXAM:  GENERAL: NAD, well-developed  HEAD:  Normocephalic. +lac with sutures   EYES: EOMI, PERRLA, conjunctiva and sclera clear  NECK: Supple, No JVD  CHEST/LUNG: Clear to auscultation bilaterally; No wheeze  HEART: S1, S2;    ABDOMEN: Soft, Nontender, Nondistended; Bowel sounds present  EXTREMITIES:  2+ Peripheral Pulses, No clubbing, cyanosis, or edema.    PSYCH: confused  NEUROLOGY: AAOX1-2; non-focal  SKIN: No rashes or lesions    Consultant(s) Notes Reviewed:  [x ] YES  [ ] NO  Care Discussed with Consultants/Other Providers [ x] YES  [ ] NO    MEDS:   MEDICATIONS  (STANDING):  acetaminophen     Tablet .. 975 milliGRAM(s) Oral every 8 hours  donepezil 10 milliGRAM(s) Oral at bedtime  enalapril 5 milliGRAM(s) Oral daily  enoxaparin Injectable 30 milliGRAM(s) SubCutaneous every 24 hours  metoprolol tartrate 50 milliGRAM(s) Oral daily  polyethylene glycol 3350 17 Gram(s) Oral daily  senna 2 Tablet(s) Oral at bedtime  sodium chloride 0.9%. 1000 milliLiter(s) (125 mL/Hr) IV Continuous <Continuous>    MEDICATIONS  (PRN):  bisacodyl Suppository 10 milliGRAM(s) Rectal daily PRN If no bowel movement  melatonin 3 milliGRAM(s) Oral at bedtime PRN Insomnia  ondansetron Injectable 4 milliGRAM(s) IV Push every 6 hours PRN Nausea and/or Vomiting  oxyCODONE    IR 5 milliGRAM(s) Oral every 4 hours PRN Severe Pain (7 - 10)  oxyCODONE    IR 2.5 milliGRAM(s) Oral every 4 hours PRN Moderate Pain (4 - 6)      ALLERGIES:  No Known Allergies      LABS:                                             9.1    6.49  )-----------( 121      ( 12 Apr 2024 06:37 )             29.0   04-12    146<H>  |  114<H>  |  33<H>  ----------------------------<  82  4.2   |  19<L>  |  1.25    Ca    8.5      12 Apr 2024 06:37           < from: TTE Limited W or WO Ultrasound Enhancing Agent (04.09.24 @ 15:25) >  _______________________     CONCLUSIONS:      1. Left ventricular cavity is small. Left ventricular systolic function is hyperdynamic. Regional wall motion abnormalities present.   2. 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   3. Mild to moderate pulmonary hypertension.    _______________________________________________________________    < end of copied text >      < from: Xray Knee 1 or 2 Views, Right (04.09.24 @ 10:27) >    IMPRESSION:  Comminuted displaced right intertrochanteric hip fracture with avulsion   of the lesser trochanter. No dislocation. No advanced hip arthrosis.   There is external rotation of the distal fracture fragment by   approximately 90 degrees. No advanced knee arthrosis. There are vascular   calcifications. 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    --- End of Report ---    < end of copied text >  < from: Xray Hand 2 Views, Bilateral (04.09.24 @ 10:27) >    IMPRESSION:  Limited evaluation of the distal phalanges due to positioning. Bones are   demineralized. No acute fracture appreciated. 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 There is bilateral distal interphalangeal arthrosis.   No radiopaque foreign bodies visualized.    --- End of Report ---    < end of copied text >  < from: CT Abdomen and Pelvis w/ IV Cont (04.09.24 @ 08:33) >  IMPRESSION:  Acute comminuted mildly impacted intertrochanteric right femur fracture   and lesser trochanter fracture with mild right hip/inferior gluteal   intramuscular hematoma/hemorrhage as well as overlying contusion.    Few scattered sub-0.6 cm pulmonary nodules, nonspecific. Findings may be   infection or inflammatory in etiology.    These findings were discussed with Dr. Noble at 4/9/2024 9:41 AM by Dr. Chavez with read back.    --- End of Report ---    < end of copied text >  < from: CT Head No Cont (04.09.24 @ 08:32) >  IMPRESSION:  CT head:  -No acute intracranial findings.  -Right periorbital soft tissue swelling. No fracture identified.    CT cervical spine:  -No acute fracture or dislocation.  -1.8 cm right parotid lesion. Possible left parotid lesion is also noted   measuring up to 1.4 cm. Salivary gland neoplasm or metastatic disease is   in the differential. Recommend CT neck with IV contrast for more complete   parotid and cervical lymph node imaging.    --- End of Report ---    < end of copied text >    
TOMASA FUENTES  89y Male  MRN:[id number]    Patient is a 89y old  Male who presents with a chief complaint of fall    HPI:  89 yo male h/o htn, dementia, cad s/p pci, p/w R hip pain after being found down. Pt only A&Ox1 in ED.  History obtained from son Vlad.  Per son pt lives at home with visiting nurse services and was found down outside by neighbor early this AM.  Camera footage suggests patient likely fell.  Was brought to ED and now has R hip pain.  Has been unableto bear weight in the RLE since the fall. Pt unclear regarding events surrounding incident and does not know if he hit his head or lost consciousness but does have a scalp lac. At baseline, pt lives in apartment with help of nursing services and ambulates with a walker/cane (09 Apr 2024 14:43)      Patient seen and evaluated at bedside. No acute events overnight except as noted.    Interval HPI: no acute events o/n     PAST MEDICAL & SURGICAL HISTORY:  CAD (coronary atherosclerotic disease)      Dementia      Hypertension          REVIEW OF SYSTEMS:  as per hpi        Vital Signs Last 24 Hrs  T(C): 36.6 (15 Apr 2024 16:40), Max: 37.1 (14 Apr 2024 21:15)  T(F): 97.8 (15 Apr 2024 16:40), Max: 98.8 (14 Apr 2024 21:15)  HR: 78 (15 Apr 2024 16:40) (73 - 96)  BP: 129/57 (15 Apr 2024 16:40) (109/60 - 155/61)  BP(mean): --  RR: 18 (15 Apr 2024 16:40) (18 - 18)  SpO2: 95% (15 Apr 2024 16:40) (94% - 98%)    Parameters below as of 15 Apr 2024 16:40  Patient On (Oxygen Delivery Method): room air        PHYSICAL EXAM:  GENERAL: NAD, well-developed  HEAD:  Normocephalic. +lac with sutures   EYES: EOMI, PERRLA, conjunctiva and sclera clear  NECK: Supple, No JVD  CHEST/LUNG: Clear to auscultation bilaterally; No wheeze  HEART: S1, S2;    ABDOMEN: Soft, Nontender, Nondistended; Bowel sounds present  EXTREMITIES:  2+ Peripheral Pulses, No clubbing, cyanosis, or edema.    PSYCH: confused  NEUROLOGY: AAOX1-2; non-focal  SKIN: No rashes or lesions    Consultant(s) Notes Reviewed:  [x ] YES  [ ] NO  Care Discussed with Consultants/Other Providers [ x] YES  [ ] NO    MEDS:   MEDICATIONS  (STANDING):  acetaminophen     Tablet .. 975 milliGRAM(s) Oral every 8 hours  donepezil 10 milliGRAM(s) Oral at bedtime  enalapril 5 milliGRAM(s) Oral daily  enoxaparin Injectable 30 milliGRAM(s) SubCutaneous every 24 hours  metoprolol tartrate 50 milliGRAM(s) Oral daily  polyethylene glycol 3350 17 Gram(s) Oral daily  senna 2 Tablet(s) Oral at bedtime  sodium chloride 0.9%. 1000 milliLiter(s) (75 mL/Hr) IV Continuous <Continuous>  sodium chloride 0.9%. 1000 milliLiter(s) (125 mL/Hr) IV Continuous <Continuous>    MEDICATIONS  (PRN):  bisacodyl Suppository 10 milliGRAM(s) Rectal daily PRN If no bowel movement  melatonin 3 milliGRAM(s) Oral at bedtime PRN Insomnia  ondansetron Injectable 4 milliGRAM(s) IV Push every 6 hours PRN Nausea and/or Vomiting  oxyCODONE    IR 2.5 milliGRAM(s) Oral every 4 hours PRN Moderate Pain (4 - 6)  oxyCODONE    IR 5 milliGRAM(s) Oral every 4 hours PRN Severe Pain (7 - 10)      ALLERGIES:  No Known Allergies      LABS:                                             9.1    7.38  )-----------( 199      ( 15 Apr 2024 09:44 )             28.6   04-15    148<H>  |  117<H>  |  32<H>  ----------------------------<  98  4.1   |  15<L>  |  1.22    Ca    8.5      15 Apr 2024 09:44                                                        < from: TTE Limited W or WO Ultrasound Enhancing Agent (04.09.24 @ 15:25) >  _______________________     CONCLUSIONS:      1. Left ventricular cavity is small. Left ventricular systolic function is hyperdynamic. Regional wall motion abnormalities present.   2. 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   3. Mild to moderate pulmonary hypertension.    _______________________________________________________________    < end of copied text >      < from: Xray Knee 1 or 2 Views, Right (04.09.24 @ 10:27) >    IMPRESSION:  Comminuted displaced right intertrochanteric hip fracture with avulsion   of the lesser trochanter. No dislocation. No advanced hip arthrosis.   There is external rotation of the distal fracture fragment by   approximately 90 degrees. No advanced knee arthrosis. There are vascular   calcifications. 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    --- End of Report ---    < end of copied text >  < from: Xray Hand 2 Views, Bilateral (04.09.24 @ 10:27) >    IMPRESSION:  Limited evaluation of the distal phalanges due to positioning. Bones are   demineralized. No acute fracture appreciated. 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 There is bilateral distal interphalangeal arthrosis.   No radiopaque foreign bodies visualized.    --- End of Report ---    < end of copied text >  < from: CT Abdomen and Pelvis w/ IV Cont (04.09.24 @ 08:33) >  IMPRESSION:  Acute comminuted mildly impacted intertrochanteric right femur fracture   and lesser trochanter fracture with mild right hip/inferior gluteal   intramuscular hematoma/hemorrhage as well as overlying contusion.    Few scattered sub-0.6 cm pulmonary nodules, nonspecific. Findings may be   infection or inflammatory in etiology.    These findings were discussed with Dr. Noble at 4/9/2024 9:41 AM by Dr. Chavez with read back.    --- End of Report ---    < end of copied text >  < from: CT Head No Cont (04.09.24 @ 08:32) >  IMPRESSION:  CT head:  -No acute intracranial findings.  -Right periorbital soft tissue swelling. No fracture identified.    CT cervical spine:  -No acute fracture or dislocation.  -1.8 cm right parotid lesion. Possible left parotid lesion is also noted   measuring up to 1.4 cm. Salivary gland neoplasm or metastatic disease is   in the differential. Recommend CT neck with IV contrast for more complete   parotid and cervical lymph node imaging.    --- End of Report ---    < end of copied text >    
TOMASA FUENTES  89y Male  MRN:[id number]    Patient is a 89y old  Male who presents with a chief complaint of fall    HPI:  89 yo male h/o htn, dementia, cad s/p pci, p/w R hip pain after being found down. Pt only A&Ox1 in ED.  History obtained from son Vlad.  Per son pt lives at home with visiting nurse services and was found down outside by neighbor early this AM.  Camera footage suggests patient likely fell.  Was brought to ED and now has R hip pain.  Has been unableto bear weight in the RLE since the fall. Pt unclear regarding events surrounding incident and does not know if he hit his head or lost consciousness but does have a scalp lac. At baseline, pt lives in apartment with help of nursing services and ambulates with a walker/cane (09 Apr 2024 14:43)      Patient seen and evaluated at bedside. No acute events overnight except as noted.    Interval HPI: no acute events o/n     PAST MEDICAL & SURGICAL HISTORY:  CAD (coronary atherosclerotic disease)      Dementia      Hypertension          REVIEW OF SYSTEMS:  as per hpi        Vital Signs Last 24 Hrs  T(C): 36.6 (13 Apr 2024 20:20), Max: 36.7 (12 Apr 2024 23:48)  T(F): 97.8 (13 Apr 2024 20:20), Max: 98 (12 Apr 2024 23:48)  HR: 96 (13 Apr 2024 20:20) (83 - 97)  BP: 137/75 (13 Apr 2024 20:20) (132/74 - 144/70)  BP(mean): --  RR: 18 (13 Apr 2024 20:20) (16 - 18)  SpO2: 93% (13 Apr 2024 20:20) (93% - 98%)    Parameters below as of 13 Apr 2024 20:20  Patient On (Oxygen Delivery Method): room air        PHYSICAL EXAM:  GENERAL: NAD, well-developed  HEAD:  Normocephalic. +lac with sutures   EYES: EOMI, PERRLA, conjunctiva and sclera clear  NECK: Supple, No JVD  CHEST/LUNG: Clear to auscultation bilaterally; No wheeze  HEART: S1, S2;    ABDOMEN: Soft, Nontender, Nondistended; Bowel sounds present  EXTREMITIES:  2+ Peripheral Pulses, No clubbing, cyanosis, or edema.    PSYCH: confused  NEUROLOGY: AAOX1-2; non-focal  SKIN: No rashes or lesions    Consultant(s) Notes Reviewed:  [x ] YES  [ ] NO  Care Discussed with Consultants/Other Providers [ x] YES  [ ] NO    MEDS:   MEDICATIONS  (STANDING):  acetaminophen     Tablet .. 975 milliGRAM(s) Oral every 8 hours  donepezil 10 milliGRAM(s) Oral at bedtime  enalapril 5 milliGRAM(s) Oral daily  enoxaparin Injectable 30 milliGRAM(s) SubCutaneous every 24 hours  metoprolol tartrate 50 milliGRAM(s) Oral daily  polyethylene glycol 3350 17 Gram(s) Oral daily  senna 2 Tablet(s) Oral at bedtime  sodium chloride 0.9%. 1000 milliLiter(s) (75 mL/Hr) IV Continuous <Continuous>  sodium chloride 0.9%. 1000 milliLiter(s) (125 mL/Hr) IV Continuous <Continuous>    MEDICATIONS  (PRN):  bisacodyl Suppository 10 milliGRAM(s) Rectal daily PRN If no bowel movement  melatonin 3 milliGRAM(s) Oral at bedtime PRN Insomnia  ondansetron Injectable 4 milliGRAM(s) IV Push every 6 hours PRN Nausea and/or Vomiting  oxyCODONE    IR 2.5 milliGRAM(s) Oral every 4 hours PRN Moderate Pain (4 - 6)  oxyCODONE    IR 5 milliGRAM(s) Oral every 4 hours PRN Severe Pain (7 - 10)      ALLERGIES:  No Known Allergies      LABS:                                                              9.1    6.49  )-----------( 121      ( 12 Apr 2024 06:37 )             29.0   04-12    146<H>  |  114<H>  |  33<H>  ----------------------------<  82  4.2   |  19<L>  |  1.25    Ca    8.5      12 Apr 2024 06:37               < from: TTE Limited W or WO Ultrasound Enhancing Agent (04.09.24 @ 15:25) >  _______________________     CONCLUSIONS:      1. Left ventricular cavity is small. Left ventricular systolic function is hyperdynamic. Regional wall motion abnormalities present.   2. 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   3. Mild to moderate pulmonary hypertension.    _______________________________________________________________    < end of copied text >      < from: Xray Knee 1 or 2 Views, Right (04.09.24 @ 10:27) >    IMPRESSION:  Comminuted displaced right intertrochanteric hip fracture with avulsion   of the lesser trochanter. No dislocation. No advanced hip arthrosis.   There is external rotation of the distal fracture fragment by   approximately 90 degrees. No advanced knee arthrosis. There are vascular   calcifications. 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    --- End of Report ---    < end of copied text >  < from: Xray Hand 2 Views, Bilateral (04.09.24 @ 10:27) >    IMPRESSION:  Limited evaluation of the distal phalanges due to positioning. Bones are   demineralized. No acute fracture appreciated. 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 There is bilateral distal interphalangeal arthrosis.   No radiopaque foreign bodies visualized.    --- End of Report ---    < end of copied text >  < from: CT Abdomen and Pelvis w/ IV Cont (04.09.24 @ 08:33) >  IMPRESSION:  Acute comminuted mildly impacted intertrochanteric right femur fracture   and lesser trochanter fracture with mild right hip/inferior gluteal   intramuscular hematoma/hemorrhage as well as overlying contusion.    Few scattered sub-0.6 cm pulmonary nodules, nonspecific. Findings may be   infection or inflammatory in etiology.    These findings were discussed with Dr. Noble at 4/9/2024 9:41 AM by Dr. Chavez with read back.    --- End of Report ---    < end of copied text >  < from: CT Head No Cont (04.09.24 @ 08:32) >  IMPRESSION:  CT head:  -No acute intracranial findings.  -Right periorbital soft tissue swelling. No fracture identified.    CT cervical spine:  -No acute fracture or dislocation.  -1.8 cm right parotid lesion. Possible left parotid lesion is also noted   measuring up to 1.4 cm. Salivary gland neoplasm or metastatic disease is   in the differential. Recommend CT neck with IV contrast for more complete   parotid and cervical lymph node imaging.    --- End of Report ---    < end of copied text >    
  Patient resting without complaints.  No events overnight.  No chest pain, SOB, N/V.    T(C): 36.8 (04-14-24 @ 12:22), Max: 36.9 (04-13-24 @ 23:50)  HR: 82 (04-14-24 @ 12:22) (74 - 105)  BP: 102/60 (04-14-24 @ 12:22) (102/60 - 146/63)  RR: 18 (04-14-24 @ 12:22) (18 - 18)  SpO2: 99% (04-14-24 @ 12:22) (92% - 99%)      Exam:  Gen: Awake, [place], pleasantly confused, follows commands  Lower Extremities:  RLE: DSGS C/D/I, compartments soft, dull sensation grossly inact, DP1+,   Toes mobile   Calves Soft, Non-tender bilaterally          Labs: CBC pend       
ORTHOPAEDIC PROGRESS NOTE    SUBJECTIVE:  Pt seen and examined at bedside this am.  Doing well.  No acute events overnight.      OBJECTIVE:  Vital Signs Last 24 Hrs  T(C): 36.7 (11 Apr 2024 04:11), Max: 36.7 (11 Apr 2024 04:09)  T(F): 98 (11 Apr 2024 04:11), Max: 98 (11 Apr 2024 04:09)  HR: 98 (11 Apr 2024 04:11) (78 - 106)  BP: 123/74 (11 Apr 2024 04:11) (101/48 - 129/72)  BP(mean): 80 (10 Apr 2024 23:45) (69 - 102)  RR: 18 (11 Apr 2024 04:11) (16 - 18)  SpO2: 98% (11 Apr 2024 04:11) (94% - 100%)    Parameters below as of 11 Apr 2024 04:11  Patient On (Oxygen Delivery Method): room air        Physical Exam:  General: A&Ox1, NAD, resting comfortably in bed.  Resp: non labored  RLE  Aquacel dressings x2 c/d/i  Sensation intact to light touch  (+) DF/PF/EHL/FHL  1+ DP/PT pulses.  Calves soft, nontender bilaterally    LABS                        10.0   9.29  )-----------( 120      ( 11 Apr 2024 00:00 )             29.9       04-11    146<H>  |  113<H>  |  33<H>  ----------------------------<  116<H>  4.6   |  18<L>  |  1.30    Ca    7.8<L>      11 Apr 2024 00:00    TPro  5.5<L>  /  Alb  3.2<L>  /  TBili  0.6  /  DBili  x   /  AST  12  /  ALT  11  /  AlkPhos  73  04-09      PT/INR - ( 10 Apr 2024 02:50 )   PT: 12.6 sec;   INR: 1.15 ratio         PTT - ( 10 Apr 2024 02:50 )  PTT:29.3 sec    I&O's Summary    10 Apr 2024 07:01  -  11 Apr 2024 06:33  --------------------------------------------------------  IN: 1330 mL / OUT: 30 mL / NET: 1300 mL      
ORTHOPAEDIC PROGRESS NOTE    SUBJECTIVE:  Pt seen and examined at bedside this am.  Doing well.  No acute events overnight.  Pt states pain is well controlled    OBJECTIVE:  Vital Signs Last 24 Hrs  T(C): 36.8 (15 Apr 2024 05:00), Max: 37.1 (14 Apr 2024 21:15)  T(F): 98.2 (15 Apr 2024 05:00), Max: 98.8 (14 Apr 2024 21:15)  HR: 96 (15 Apr 2024 05:30) (74 - 96)  BP: 146/76 (15 Apr 2024 05:30) (102/60 - 146/76)  BP(mean): --  RR: 18 (15 Apr 2024 05:00) (18 - 18)  SpO2: 94% (15 Apr 2024 05:00) (92% - 99%)    Parameters below as of 15 Apr 2024 05:00  Patient On (Oxygen Delivery Method): room air        Physical Exam:  General: NAD; resting comfrotably in bed  Resp: non labored  RLE  Noted areas of ecchymoses surrounding posterolateral thigh and lower back  Aquacel dressings w mild strikethrough but otherwise   Motor: IP ROM limited by pain but firing, quad/gastroc/sol/TA/EHL//FHL in tact  SILT throughout RLE  Compartments soft and compressible  DP palpable    LABS                        8.2    5.67  )-----------( 172      ( 14 Apr 2024 14:33 )             25.9                   I&O's Summary    13 Apr 2024 07:01  -  14 Apr 2024 07:00  --------------------------------------------------------  IN: 640 mL / OUT: 200 mL / NET: 440 mL    14 Apr 2024 07:01  -  15 Apr 2024 06:19  --------------------------------------------------------  IN: 720 mL / OUT: 0 mL / NET: 720 mL          
Subjective: Patient seen and examined. No new events except as noted.     REVIEW OF SYSTEMS:    CONSTITUTIONAL:+ weakness, fevers or chills  EYES/ENT: No visual changes;  No vertigo or throat pain   NECK: No pain or stiffness  RESPIRATORY: No cough, wheezing, hemoptysis; No shortness of breath  CARDIOVASCULAR: No chest pain or palpitations  GASTROINTESTINAL: No abdominal or epigastric pain. No nausea, vomiting, or hematemesis; No diarrhea or constipation. No melena or hematochezia.  GENITOURINARY: No dysuria, frequency or hematuria  NEUROLOGICAL: No numbness or weakness  SKIN: No itching, burning, rashes, or lesions   All other review of systems is negative unless indicated above.    MEDICATIONS:  MEDICATIONS  (STANDING):  acetaminophen     Tablet .. 975 milliGRAM(s) Oral every 8 hours  ceFAZolin   IVPB 2000 milliGRAM(s) IV Intermittent every 8 hours  donepezil 10 milliGRAM(s) Oral at bedtime  enalapril 5 milliGRAM(s) Oral daily  enoxaparin Injectable 30 milliGRAM(s) SubCutaneous every 24 hours  metoprolol tartrate 50 milliGRAM(s) Oral daily  polyethylene glycol 3350 17 Gram(s) Oral daily  senna 2 Tablet(s) Oral at bedtime  sodium chloride 0.9%. 1000 milliLiter(s) (125 mL/Hr) IV Continuous <Continuous>      PHYSICAL EXAM:  T(C): 36.7 (04-11-24 @ 04:11), Max: 36.7 (04-11-24 @ 04:09)  HR: 98 (04-11-24 @ 04:11) (78 - 106)  BP: 123/74 (04-11-24 @ 04:11) (101/48 - 129/72)  RR: 18 (04-11-24 @ 04:11) (16 - 18)  SpO2: 98% (04-11-24 @ 04:11) (94% - 100%)  Wt(kg): --  I&O's Summary    10 Apr 2024 07:01  -  11 Apr 2024 07:00  --------------------------------------------------------  IN: 1330 mL / OUT: 30 mL / NET: 1300 mL        Weight (kg): 53.8 (04-10 @ 19:52)    Appearance: NAD Right forehead hematoma   HEENT:   Normal oral mucosa, PERRL, EOMI	  Lymphatic: No lymphadenopathy  Cardiovascular: Normal S1 S2, No JVD, No murmurs, No edema  Respiratory: Decreased bs   Psychiatry: A & O x 2  Gastrointestinal:  Soft, Non-tender, + BS	  Skin: No rashes, No ecchymoses, No cyanosis	  Neurologic: Non-focal  RLE  Aquacel dressings x2 c/d/i  Sensation intact to light touch  (+) DF/PF/EHL/FHL  1+ DP/PT pulses.  Calves soft, nontender bilaterally      LABS:    CARDIAC MARKERS:  CARDIAC MARKERS ( 09 Apr 2024 09:09 )  x     / x     / 193 U/L / x     / x      CARDIAC MARKERS ( 09 Apr 2024 06:54 )  x     / x     / 280 U/L / x     / x                                    10.0   9.29  )-----------( 120      ( 11 Apr 2024 00:00 )             29.9     04-11    146<H>  |  113<H>  |  33<H>  ----------------------------<  116<H>  4.6   |  18<L>  |  1.30    Ca    7.8<L>      11 Apr 2024 00:00        TELEMETRY: 	    ECG:  	  RADIOLOGY:   DIAGNOSTIC TESTING:  [ ] Echocardiogram:  [ ]  Catheterization:  [ ] Stress Test:    OTHER: 	          
TOMASA FUENTES  89y Male  MRN:[id number]    Patient is a 89y old  Male who presents with a chief complaint of fall    HPI:  89 yo male h/o htn, dementia, cad s/p pci, p/w R hip pain after being found down. Pt only A&Ox1 in ED.  History obtained from son Vlad.  Per son pt lives at home with visiting nurse services and was found down outside by neighbor early this AM.  Camera footage suggests patient likely fell.  Was brought to ED and now has R hip pain.  Has been unableto bear weight in the RLE since the fall. Pt unclear regarding events surrounding incident and does not know if he hit his head or lost consciousness but does have a scalp lac. At baseline, pt lives in apartment with help of nursing services and ambulates with a walker/cane (09 Apr 2024 14:43)      Patient seen and evaluated at bedside. No acute events overnight except as noted.    Interval HPI: no acute events o/n     PAST MEDICAL & SURGICAL HISTORY:  CAD (coronary atherosclerotic disease)      Dementia      Hypertension          REVIEW OF SYSTEMS:  as per hpi        Vital Signs Last 24 Hrs  T(C): 36.8 (14 Apr 2024 12:22), Max: 36.9 (13 Apr 2024 23:50)  T(F): 98.3 (14 Apr 2024 12:22), Max: 98.5 (13 Apr 2024 23:50)  HR: 82 (14 Apr 2024 12:22) (74 - 105)  BP: 102/60 (14 Apr 2024 12:22) (102/60 - 146/63)  BP(mean): --  RR: 18 (14 Apr 2024 12:22) (18 - 18)  SpO2: 99% (14 Apr 2024 12:22) (92% - 99%)    Parameters below as of 14 Apr 2024 12:22  Patient On (Oxygen Delivery Method): room air      PHYSICAL EXAM:  GENERAL: NAD, well-developed  HEAD:  Normocephalic. +lac with sutures   EYES: EOMI, PERRLA, conjunctiva and sclera clear  NECK: Supple, No JVD  CHEST/LUNG: Clear to auscultation bilaterally; No wheeze  HEART: S1, S2;    ABDOMEN: Soft, Nontender, Nondistended; Bowel sounds present  EXTREMITIES:  2+ Peripheral Pulses, No clubbing, cyanosis, or edema.    PSYCH: confused  NEUROLOGY: AAOX1-2; non-focal  SKIN: No rashes or lesions    Consultant(s) Notes Reviewed:  [x ] YES  [ ] NO  Care Discussed with Consultants/Other Providers [ x] YES  [ ] NO    MEDS:   MEDICATIONS  (STANDING):  acetaminophen     Tablet .. 975 milliGRAM(s) Oral every 8 hours  donepezil 10 milliGRAM(s) Oral at bedtime  enalapril 5 milliGRAM(s) Oral daily  enoxaparin Injectable 30 milliGRAM(s) SubCutaneous every 24 hours  metoprolol tartrate 50 milliGRAM(s) Oral daily  polyethylene glycol 3350 17 Gram(s) Oral daily  senna 2 Tablet(s) Oral at bedtime  sodium chloride 0.9%. 1000 milliLiter(s) (125 mL/Hr) IV Continuous <Continuous>  sodium chloride 0.9%. 1000 milliLiter(s) (75 mL/Hr) IV Continuous <Continuous>    MEDICATIONS  (PRN):  bisacodyl Suppository 10 milliGRAM(s) Rectal daily PRN If no bowel movement  melatonin 3 milliGRAM(s) Oral at bedtime PRN Insomnia  ondansetron Injectable 4 milliGRAM(s) IV Push every 6 hours PRN Nausea and/or Vomiting  oxyCODONE    IR 2.5 milliGRAM(s) Oral every 4 hours PRN Moderate Pain (4 - 6)  oxyCODONE    IR 5 milliGRAM(s) Oral every 4 hours PRN Severe Pain (7 - 10)      ALLERGIES:  No Known Allergies      LABS:                                                                        8.2    5.67  )-----------( 172      ( 14 Apr 2024 14:33 )             25.9             < from: TTE Limited W or WO Ultrasound Enhancing Agent (04.09.24 @ 15:25) >  _______________________     CONCLUSIONS:      1. Left ventricular cavity is small. Left ventricular systolic function is hyperdynamic. Regional wall motion abnormalities present.   2. 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   3. Mild to moderate pulmonary hypertension.    _______________________________________________________________    < end of copied text >      < from: Xray Knee 1 or 2 Views, Right (04.09.24 @ 10:27) >    IMPRESSION:  Comminuted displaced right intertrochanteric hip fracture with avulsion   of the lesser trochanter. No dislocation. No advanced hip arthrosis.   There is external rotation of the distal fracture fragment by   approximately 90 degrees. No advanced knee arthrosis. There are vascular   calcifications. 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    --- End of Report ---    < end of copied text >  < from: Xray Hand 2 Views, Bilateral (04.09.24 @ 10:27) >    IMPRESSION:  Limited evaluation of the distal phalanges due to positioning. Bones are   demineralized. No acute fracture appreciated. 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 There is bilateral distal interphalangeal arthrosis.   No radiopaque foreign bodies visualized.    --- End of Report ---    < end of copied text >  < from: CT Abdomen and Pelvis w/ IV Cont (04.09.24 @ 08:33) >  IMPRESSION:  Acute comminuted mildly impacted intertrochanteric right femur fracture   and lesser trochanter fracture with mild right hip/inferior gluteal   intramuscular hematoma/hemorrhage as well as overlying contusion.    Few scattered sub-0.6 cm pulmonary nodules, nonspecific. Findings may be   infection or inflammatory in etiology.    These findings were discussed with Dr. Noble at 4/9/2024 9:41 AM by Dr. Chavez with read back.    --- End of Report ---    < end of copied text >  < from: CT Head No Cont (04.09.24 @ 08:32) >  IMPRESSION:  CT head:  -No acute intracranial findings.  -Right periorbital soft tissue swelling. No fracture identified.    CT cervical spine:  -No acute fracture or dislocation.  -1.8 cm right parotid lesion. Possible left parotid lesion is also noted   measuring up to 1.4 cm. Salivary gland neoplasm or metastatic disease is   in the differential. Recommend CT neck with IV contrast for more complete   parotid and cervical lymph node imaging.    --- End of Report ---    < end of copied text >    
ORTHOPAEDIC PROGRESS NOTE    SUBJECTIVE:  Pt seen and examined at bedside this am.  Doing well.  No acute events overnight.  Pt states pain is well controlled    OBJECTIVE:  Vital Signs Last 24 Hrs  T(C): 36.7 (16 Apr 2024 05:06), Max: 36.8 (15 Apr 2024 12:45)  T(F): 98 (16 Apr 2024 05:06), Max: 98.3 (15 Apr 2024 20:18)  HR: 69 (16 Apr 2024 05:06) (69 - 88)  BP: 151/82 (16 Apr 2024 05:06) (129/57 - 155/61)  BP(mean): --  RR: 18 (16 Apr 2024 05:06) (18 - 18)  SpO2: 95% (16 Apr 2024 05:06) (94% - 98%)    Parameters below as of 16 Apr 2024 05:06  Patient On (Oxygen Delivery Method): room air        Physical Exam:  General: NAD; resting comfrotably in bed  Resp: non labored  RLE  Still with noted areas of ecchymoses surrounding posterolateral thigh and lower back as well as RUE  Aquacel dressings w mild strikethrough but otherwise c/d/i  Motor: IP ROM limited by pain but firing, quad/gastroc/sol/TA/EHL//FHL in tact  SILT throughout RLE  Compartments soft and compressible  DP palpable    LABS                        9.1    7.38  )-----------( 199      ( 15 Apr 2024 09:44 )             28.6       04-15    148<H>  |  117<H>  |  32<H>  ----------------------------<  98  4.1   |  15<L>  |  1.22    Ca    8.5      15 Apr 2024 09:44            I&O's Summary    14 Apr 2024 07:01  -  15 Apr 2024 07:00  --------------------------------------------------------  IN: 720 mL / OUT: 0 mL / NET: 720 mL    15 Apr 2024 07:01 - 16 Apr 2024 06:22  --------------------------------------------------------  IN: 520 mL / OUT: 100 mL / NET: 420 mL      
TOMASA FUENTES  89y Male  MRN:[id number]    Patient is a 89y old  Male who presents with a chief complaint of fall    HPI:  89 yo male h/o htn, dementia, cad s/p pci, p/w R hip pain after being found down. Pt only A&Ox1 in ED.  History obtained from son Vlad.  Per son pt lives at home with visiting nurse services and was found down outside by neighbor early this AM.  Camera footage suggests patient likely fell.  Was brought to ED and now has R hip pain.  Has been unableto bear weight in the RLE since the fall. Pt unclear regarding events surrounding incident and does not know if he hit his head or lost consciousness but does have a scalp lac. At baseline, pt lives in apartment with help of nursing services and ambulates with a walker/cane (09 Apr 2024 14:43)      Patient seen and evaluated at bedside. No acute events overnight except as noted.    Interval HPI: no acute events o/n     PAST MEDICAL & SURGICAL HISTORY:  CAD (coronary atherosclerotic disease)      Dementia      Hypertension          REVIEW OF SYSTEMS:  as per hpi       Vital Signs Last 24 Hrs  T(C): 36.7 (16 Apr 2024 08:16), Max: 36.8 (15 Apr 2024 12:45)  T(F): 98.1 (16 Apr 2024 08:16), Max: 98.3 (15 Apr 2024 20:18)  HR: 87 (16 Apr 2024 08:16) (69 - 88)  BP: 157/67 (16 Apr 2024 08:16) (129/57 - 157/67)  BP(mean): --  RR: 18 (16 Apr 2024 08:16) (18 - 18)  SpO2: 94% (16 Apr 2024 08:16) (94% - 98%)    Parameters below as of 16 Apr 2024 08:16  Patient On (Oxygen Delivery Method): room air          PHYSICAL EXAM:  GENERAL: NAD, well-developed  HEAD:  Normocephalic. +lac with sutures   EYES: EOMI, PERRLA, conjunctiva and sclera clear  NECK: Supple, No JVD  CHEST/LUNG: Clear to auscultation bilaterally; No wheeze  HEART: S1, S2;    ABDOMEN: Soft, Nontender, Nondistended; Bowel sounds present  EXTREMITIES:  2+ Peripheral Pulses, No clubbing, cyanosis, or edema.    PSYCH: confused  NEUROLOGY: AAOX1-2; non-focal  SKIN: No rashes or lesions    Consultant(s) Notes Reviewed:  [x ] YES  [ ] NO  Care Discussed with Consultants/Other Providers [ x] YES  [ ] NO    MEDS:  MEDICATIONS  (STANDING):  acetaminophen     Tablet .. 975 milliGRAM(s) Oral every 8 hours  donepezil 10 milliGRAM(s) Oral at bedtime  enalapril 5 milliGRAM(s) Oral daily  enoxaparin Injectable 30 milliGRAM(s) SubCutaneous every 24 hours  metoprolol tartrate 50 milliGRAM(s) Oral daily  polyethylene glycol 3350 17 Gram(s) Oral daily  senna 2 Tablet(s) Oral at bedtime  sodium chloride 0.9%. 1000 milliLiter(s) (125 mL/Hr) IV Continuous <Continuous>  sodium chloride 0.9%. 1000 milliLiter(s) (75 mL/Hr) IV Continuous <Continuous>    MEDICATIONS  (PRN):  bisacodyl Suppository 10 milliGRAM(s) Rectal daily PRN If no bowel movement  melatonin 3 milliGRAM(s) Oral at bedtime PRN Insomnia  ondansetron Injectable 4 milliGRAM(s) IV Push every 6 hours PRN Nausea and/or Vomiting  oxyCODONE    IR 2.5 milliGRAM(s) Oral every 4 hours PRN Moderate Pain (4 - 6)  oxyCODONE    IR 5 milliGRAM(s) Oral every 4 hours PRN Severe Pain (7 - 10)      ALLERGIES:  No Known Allergies      LABS:                                                                9.1    7.38  )-----------( 199      ( 15 Apr 2024 09:44 )             28.6   04-15    148<H>  |  117<H>  |  32<H>  ----------------------------<  98  4.1   |  15<L>  |  1.22    Ca    8.5      15 Apr 2024 09:44                                                          < from: TTE Limited W or WO Ultrasound Enhancing Agent (04.09.24 @ 15:25) >  _______________________     CONCLUSIONS:      1. Left ventricular cavity is small. Left ventricular systolic function is hyperdynamic. Regional wall motion abnormalities present.   2. 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   3. Mild to moderate pulmonary hypertension.    _______________________________________________________________    < end of copied text >      < from: Xray Knee 1 or 2 Views, Right (04.09.24 @ 10:27) >    IMPRESSION:  Comminuted displaced right intertrochanteric hip fracture with avulsion   of the lesser trochanter. No dislocation. No advanced hip arthrosis.   There is external rotation of the distal fracture fragment by   approximately 90 degrees. No advanced knee arthrosis. There are vascular   calcifications. 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    --- End of Report ---    < end of copied text >  < from: Xray Hand 2 Views, Bilateral (04.09.24 @ 10:27) >    IMPRESSION:  Limited evaluation of the distal phalanges due to positioning. Bones are   demineralized. No acute fracture appreciated. 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 There is bilateral distal interphalangeal arthrosis.   No radiopaque foreign bodies visualized.    --- End of Report ---    < end of copied text >  < from: CT Abdomen and Pelvis w/ IV Cont (04.09.24 @ 08:33) >  IMPRESSION:  Acute comminuted mildly impacted intertrochanteric right femur fracture   and lesser trochanter fracture with mild right hip/inferior gluteal   intramuscular hematoma/hemorrhage as well as overlying contusion.    Few scattered sub-0.6 cm pulmonary nodules, nonspecific. Findings may be   infection or inflammatory in etiology.    These findings were discussed with Dr. Noble at 4/9/2024 9:41 AM by Dr. Chavez with read back.    --- End of Report ---    < end of copied text >  < from: CT Head No Cont (04.09.24 @ 08:32) >  IMPRESSION:  CT head:  -No acute intracranial findings.  -Right periorbital soft tissue swelling. No fracture identified.    CT cervical spine:  -No acute fracture or dislocation.  -1.8 cm right parotid lesion. Possible left parotid lesion is also noted   measuring up to 1.4 cm. Salivary gland neoplasm or metastatic disease is   in the differential. Recommend CT neck with IV contrast for more complete   parotid and cervical lymph node imaging.    --- End of Report ---    < end of copied text >    
TOMASA FUENTES  89y Male  MRN:[id number]    Patient is a 89y old  Male who presents with a chief complaint of fall    HPI:  89 yo male h/o htn, dementia, cad s/p pci, p/w R hip pain after being found down. Pt only A&Ox1 in ED.  History obtained from son Vlad.  Per son pt lives at home with visiting nurse services and was found down outside by neighbor early this AM.  Camera footage suggests patient likely fell.  Was brought to ED and now has R hip pain.  Has been unableto bear weight in the RLE since the fall. Pt unclear regarding events surrounding incident and does not know if he hit his head or lost consciousness but does have a scalp lac. At baseline, pt lives in apartment with help of nursing services and ambulates with a walker/cane (09 Apr 2024 14:43)      Patient seen and evaluated at bedside. No acute events overnight except as noted.    Interval HPI: no acute events o/n     PAST MEDICAL & SURGICAL HISTORY:  CAD (coronary atherosclerotic disease)      Dementia      Hypertension          REVIEW OF SYSTEMS:  as per hpi     VITALS:  Vital Signs Last 24 Hrs  T(C): 36.5 (10 Apr 2024 11:15), Max: 36.9 (09 Apr 2024 19:30)  T(F): 97.7 (10 Apr 2024 11:15), Max: 98.5 (10 Apr 2024 05:14)  HR: 84 (10 Apr 2024 11:15) (84 - 100)  BP: 128/55 (10 Apr 2024 11:15) (95/60 - 152/77)  BP(mean): 102 (09 Apr 2024 12:41) (102 - 102)  RR: 18 (10 Apr 2024 11:15) (17 - 22)  SpO2: 96% (10 Apr 2024 11:15) (92% - 97%)    Parameters below as of 10 Apr 2024 11:15  Patient On (Oxygen Delivery Method): room air      CAPILLARY BLOOD GLUCOSE        I&O's Summary      PHYSICAL EXAM:  GENERAL: NAD, well-developed  HEAD:  Normocephalic. +lac with sutures   EYES: EOMI, PERRLA, conjunctiva and sclera clear  NECK: Supple, No JVD  CHEST/LUNG: Clear to auscultation bilaterally; No wheeze  HEART: S1, S2;    ABDOMEN: Soft, Nontender, Nondistended; Bowel sounds present  EXTREMITIES:  2+ Peripheral Pulses, No clubbing, cyanosis, or edema. RLE ext rotated   PSYCH: confused  NEUROLOGY: AAOX1-2; non-focal  SKIN: No rashes or lesions    Consultant(s) Notes Reviewed:  [x ] YES  [ ] NO  Care Discussed with Consultants/Other Providers [ x] YES  [ ] NO    MEDS:  MEDICATIONS  (STANDING):  donepezil 10 milliGRAM(s) Oral at bedtime  metoprolol tartrate 50 milliGRAM(s) Oral daily  sodium chloride 0.9%. 1000 milliLiter(s) (90 mL/Hr) IV Continuous <Continuous>    MEDICATIONS  (PRN):    ALLERGIES:  No Known Allergies      LABS:                        10.4   7.71  )-----------( 153      ( 10 Apr 2024 02:50 )             31.5     04-10    142  |  109<H>  |  32<H>  ----------------------------<  133<H>  4.9   |  21<L>  |  1.57<H>    Ca    8.8      10 Apr 2024 02:50    TPro  5.5<L>  /  Alb  3.2<L>  /  TBili  0.6  /  DBili  x   /  AST  12  /  ALT  11  /  AlkPhos  73  04-09    PT/INR - ( 10 Apr 2024 02:50 )   PT: 12.6 sec;   INR: 1.15 ratio         PTT - ( 10 Apr 2024 02:50 )  PTT:29.3 sec  CARDIAC MARKERS ( 09 Apr 2024 09:09 )  x     / x     / 193 U/L / x     / x      CARDIAC MARKERS ( 09 Apr 2024 06:54 )  x     / x     / 280 U/L / x     / x          LIVER FUNCTIONS - ( 09 Apr 2024 09:09 )  Alb: 3.2 g/dL / Pro: 5.5 g/dL / ALK PHOS: 73 U/L / ALT: 11 U/L / AST: 12 U/L / GGT: x           Urinalysis Basic - ( 10 Apr 2024 02:50 )    Color: x / Appearance: x / SG: x / pH: x  Gluc: 133 mg/dL / Ketone: x  / Bili: x / Urobili: x   Blood: x / Protein: x / Nitrite: x   Leuk Esterase: x / RBC: x / WBC x   Sq Epi: x / Non Sq Epi: x / Bacteria: x       < from: TTE Limited W or WO Ultrasound Enhancing Agent (04.09.24 @ 15:25) >  _______________________     CONCLUSIONS:      1. Left ventricular cavity is small. Left ventricular systolic function is hyperdynamic. Regional wall motion abnormalities present.   2. 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   3. Mild to moderate pulmonary hypertension.    _______________________________________________________________    < end of copied text >      < from: Xray Knee 1 or 2 Views, Right (04.09.24 @ 10:27) >    IMPRESSION:  Comminuted displaced right intertrochanteric hip fracture with avulsion   of the lesser trochanter. No dislocation. No advanced hip arthrosis.   There is external rotation of the distal fracture fragment by   approximately 90 degrees. No advanced knee arthrosis. There are vascular   calcifications. 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    --- End of Report ---    < end of copied text >  < from: Xray Hand 2 Views, Bilateral (04.09.24 @ 10:27) >    IMPRESSION:  Limited evaluation of the distal phalanges due to positioning. Bones are   demineralized. No acute fracture appreciated. 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 There is bilateral distal interphalangeal arthrosis.   No radiopaque foreign bodies visualized.    --- End of Report ---    < end of copied text >  < from: CT Abdomen and Pelvis w/ IV Cont (04.09.24 @ 08:33) >  IMPRESSION:  Acute comminuted mildly impacted intertrochanteric right femur fracture   and lesser trochanter fracture with mild right hip/inferior gluteal   intramuscular hematoma/hemorrhage as well as overlying contusion.    Few scattered sub-0.6 cm pulmonary nodules, nonspecific. Findings may be   infection or inflammatory in etiology.    These findings were discussed with Dr. Noble at 4/9/2024 9:41 AM by Dr. Chavez with read back.    --- End of Report ---    < end of copied text >  < from: CT Head No Cont (04.09.24 @ 08:32) >  IMPRESSION:  CT head:  -No acute intracranial findings.  -Right periorbital soft tissue swelling. No fracture identified.    CT cervical spine:  -No acute fracture or dislocation.  -1.8 cm right parotid lesion. Possible left parotid lesion is also noted   measuring up to 1.4 cm. Salivary gland neoplasm or metastatic disease is   in the differential. Recommend CT neck with IV contrast for more complete   parotid and cervical lymph node imaging.    --- End of Report ---    < end of copied text >    
TOMASA FUENTES  89y Male  MRN:[id number]    Patient is a 89y old  Male who presents with a chief complaint of fall    HPI:  89 yo male h/o htn, dementia, cad s/p pci, p/w R hip pain after being found down. Pt only A&Ox1 in ED.  History obtained from son Vlad.  Per son pt lives at home with visiting nurse services and was found down outside by neighbor early this AM.  Camera footage suggests patient likely fell.  Was brought to ED and now has R hip pain.  Has been unableto bear weight in the RLE since the fall. Pt unclear regarding events surrounding incident and does not know if he hit his head or lost consciousness but does have a scalp lac. At baseline, pt lives in apartment with help of nursing services and ambulates with a walker/cane (09 Apr 2024 14:43)      Patient seen and evaluated at bedside. No acute events overnight except as noted.    Interval HPI: s/p IMN    PAST MEDICAL & SURGICAL HISTORY:  CAD (coronary atherosclerotic disease)      Dementia      Hypertension          REVIEW OF SYSTEMS:  as per hpi        Vital Signs Last 24 Hrs  T(C): 36.3 (11 Apr 2024 11:43), Max: 36.7 (11 Apr 2024 04:09)  T(F): 97.4 (11 Apr 2024 11:43), Max: 98 (11 Apr 2024 04:09)  HR: 72 (11 Apr 2024 11:43) (72 - 106)  BP: 95/55 (11 Apr 2024 11:43) (95/55 - 129/72)  BP(mean): 80 (10 Apr 2024 23:45) (69 - 102)  RR: 18 (11 Apr 2024 11:43) (16 - 18)  SpO2: 94% (11 Apr 2024 11:43) (94% - 100%)    Parameters below as of 11 Apr 2024 11:43  Patient On (Oxygen Delivery Method): room air        PHYSICAL EXAM:  GENERAL: NAD, well-developed  HEAD:  Normocephalic. +lac with sutures   EYES: EOMI, PERRLA, conjunctiva and sclera clear  NECK: Supple, No JVD  CHEST/LUNG: Clear to auscultation bilaterally; No wheeze  HEART: S1, S2;    ABDOMEN: Soft, Nontender, Nondistended; Bowel sounds present  EXTREMITIES:  2+ Peripheral Pulses, No clubbing, cyanosis, or edema.    PSYCH: confused  NEUROLOGY: AAOX1-2; non-focal  SKIN: No rashes or lesions    Consultant(s) Notes Reviewed:  [x ] YES  [ ] NO  Care Discussed with Consultants/Other Providers [ x] YES  [ ] NO    MEDS:   MEDICATIONS  (STANDING):  acetaminophen     Tablet .. 975 milliGRAM(s) Oral every 8 hours  ceFAZolin   IVPB 2000 milliGRAM(s) IV Intermittent every 8 hours  donepezil 10 milliGRAM(s) Oral at bedtime  enalapril 5 milliGRAM(s) Oral daily  enoxaparin Injectable 30 milliGRAM(s) SubCutaneous every 24 hours  metoprolol tartrate 50 milliGRAM(s) Oral daily  polyethylene glycol 3350 17 Gram(s) Oral daily  senna 2 Tablet(s) Oral at bedtime  sodium chloride 0.9%. 1000 milliLiter(s) (125 mL/Hr) IV Continuous <Continuous>    MEDICATIONS  (PRN):  melatonin 3 milliGRAM(s) Oral at bedtime PRN Insomnia  ondansetron Injectable 4 milliGRAM(s) IV Push every 6 hours PRN Nausea and/or Vomiting  oxyCODONE    IR 2.5 milliGRAM(s) Oral every 4 hours PRN Moderate Pain (4 - 6)  oxyCODONE    IR 5 milliGRAM(s) Oral every 4 hours PRN Severe Pain (7 - 10)      ALLERGIES:  No Known Allergies      LABS:                                            9.2    7.44  )-----------( 115      ( 11 Apr 2024 10:04 )             29.0   04-11    144  |  114<H>  |  34<H>  ----------------------------<  122<H>  4.8   |  16<L>  |  1.32<H>    Ca    8.4      11 Apr 2024 10:04           < from: TTE Limited W or WO Ultrasound Enhancing Agent (04.09.24 @ 15:25) >  _______________________     CONCLUSIONS:      1. Left ventricular cavity is small. Left ventricular systolic function is hyperdynamic. Regional wall motion abnormalities present.   2. 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   3. Mild to moderate pulmonary hypertension.    _______________________________________________________________    < end of copied text >      < from: Xray Knee 1 or 2 Views, Right (04.09.24 @ 10:27) >    IMPRESSION:  Comminuted displaced right intertrochanteric hip fracture with avulsion   of the lesser trochanter. No dislocation. No advanced hip arthrosis.   There is external rotation of the distal fracture fragment by   approximately 90 degrees. No advanced knee arthrosis. There are vascular   calcifications. 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    --- End of Report ---    < end of copied text >  < from: Xray Hand 2 Views, Bilateral (04.09.24 @ 10:27) >    IMPRESSION:  Limited evaluation of the distal phalanges due to positioning. Bones are   demineralized. No acute fracture appreciated. 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 There is bilateral distal interphalangeal arthrosis.   No radiopaque foreign bodies visualized.    --- End of Report ---    < end of copied text >  < from: CT Abdomen and Pelvis w/ IV Cont (04.09.24 @ 08:33) >  IMPRESSION:  Acute comminuted mildly impacted intertrochanteric right femur fracture   and lesser trochanter fracture with mild right hip/inferior gluteal   intramuscular hematoma/hemorrhage as well as overlying contusion.    Few scattered sub-0.6 cm pulmonary nodules, nonspecific. Findings may be   infection or inflammatory in etiology.    These findings were discussed with Dr. Noble at 4/9/2024 9:41 AM by Dr. Chavez with read back.    --- End of Report ---    < end of copied text >  < from: CT Head No Cont (04.09.24 @ 08:32) >  IMPRESSION:  CT head:  -No acute intracranial findings.  -Right periorbital soft tissue swelling. No fracture identified.    CT cervical spine:  -No acute fracture or dislocation.  -1.8 cm right parotid lesion. Possible left parotid lesion is also noted   measuring up to 1.4 cm. Salivary gland neoplasm or metastatic disease is   in the differential. Recommend CT neck with IV contrast for more complete   parotid and cervical lymph node imaging.    --- End of Report ---    < end of copied text >    
Subjective: Patient seen and examined. No new events except as noted.     REVIEW OF SYSTEMS:  Unable to obtain         MEDICATIONS:  MEDICATIONS  (STANDING):  acetaminophen     Tablet .. 975 milliGRAM(s) Oral every 8 hours  donepezil 10 milliGRAM(s) Oral at bedtime  enalapril 5 milliGRAM(s) Oral daily  enoxaparin Injectable 30 milliGRAM(s) SubCutaneous every 24 hours  metoprolol tartrate 50 milliGRAM(s) Oral daily  polyethylene glycol 3350 17 Gram(s) Oral daily  senna 2 Tablet(s) Oral at bedtime  sodium chloride 0.9%. 1000 milliLiter(s) (125 mL/Hr) IV Continuous <Continuous>      PHYSICAL EXAM:  T(C): 36.7 (04-12-24 @ 04:42), Max: 36.9 (04-11-24 @ 20:49)  HR: 90 (04-12-24 @ 08:50) (87 - 111)  BP: 110/67 (04-12-24 @ 08:50) (110/67 - 133/70)  RR: 18 (04-12-24 @ 08:50) (18 - 18)  SpO2: 95% (04-12-24 @ 08:50) (93% - 99%)  Wt(kg): --  I&O's Summary    11 Apr 2024 07:01  -  12 Apr 2024 07:00  --------------------------------------------------------  IN: 1835 mL / OUT: 400 mL / NET: 1435 mL            Appearance: NAD Right forehead hematoma   HEENT:   Normal oral mucosa, PERRL, EOMI	  Lymphatic: No lymphadenopathy  Cardiovascular: Normal S1 S2, No JVD, No murmurs, No edema  Respiratory: Decreased bs   Psychiatry: A & O x 2  Gastrointestinal:  Soft, Non-tender, + BS	  Skin: No rashes, No ecchymoses, No cyanosis	  Neurologic: Non-focal  RLE  Aquacel dressings x2 c/d/i  Sensation intact to light touch  (+) DF/PF/EHL/FHL  1+ DP/PT pulses.  Calves soft, nontender bilaterally        LABS:    CARDIAC MARKERS:                                9.1    6.49  )-----------( 121      ( 12 Apr 2024 06:37 )             29.0     04-12    146<H>  |  114<H>  |  33<H>  ----------------------------<  82  4.2   |  19<L>  |  1.25    Ca    8.5      12 Apr 2024 06:37      proBNP:   Lipid Profile:   HgA1c:   TSH:             TELEMETRY: 	    ECG:  	  RADIOLOGY:   DIAGNOSTIC TESTING:  [ ] Echocardiogram:  [ ]  Catheterization:  [ ] Stress Test:    OTHER: 	          
Subjective: Patient seen and examined. No new events except as noted.     REVIEW OF SYSTEMS:    Unable to obtain    MEDICATIONS:  MEDICATIONS  (STANDING):  acetaminophen     Tablet .. 975 milliGRAM(s) Oral every 8 hours  donepezil 10 milliGRAM(s) Oral at bedtime  enalapril 5 milliGRAM(s) Oral daily  enoxaparin Injectable 30 milliGRAM(s) SubCutaneous every 24 hours  metoprolol tartrate 50 milliGRAM(s) Oral daily  polyethylene glycol 3350 17 Gram(s) Oral daily  senna 2 Tablet(s) Oral at bedtime  sodium chloride 0.9%. 1000 milliLiter(s) (75 mL/Hr) IV Continuous <Continuous>  sodium chloride 0.9%. 1000 milliLiter(s) (125 mL/Hr) IV Continuous <Continuous>      PHYSICAL EXAM:  T(C): 36.7 (04-16-24 @ 08:16), Max: 36.8 (04-15-24 @ 12:45)  HR: 87 (04-16-24 @ 08:16) (69 - 88)  BP: 157/67 (04-16-24 @ 08:16) (129/57 - 157/67)  RR: 18 (04-16-24 @ 08:16) (18 - 18)  SpO2: 94% (04-16-24 @ 08:16) (94% - 98%)  Wt(kg): --  I&O's Summary    15 Apr 2024 07:01  -  16 Apr 2024 07:00  --------------------------------------------------------  IN: 520 mL / OUT: 100 mL / NET: 420 mL        Appearance: NAD Right forehead hematoma   HEENT:   Normal oral mucosa, PERRL, EOMI	  Lymphatic: No lymphadenopathy  Cardiovascular: Normal S1 S2, No JVD, No murmurs, No edema  Respiratory: Decreased bs   Psychiatry: A & O x 2  Gastrointestinal:  Soft, Non-tender, + BS	  Skin: No rashes, No ecchymoses, No cyanosis	  Neurologic: Non-focal  RLE  Aquacel dressings x2 c/d/i  Sensation intact to light touch  (+) DF/PF/EHL/FHL  1+ DP/PT pulses.  Calves soft, nontender bilaterall        LABS:    CARDIAC MARKERS:                                9.1    7.38  )-----------( 199      ( 15 Apr 2024 09:44 )             28.6     04-15    148<H>  |  117<H>  |  32<H>  ----------------------------<  98  4.1   |  15<L>  |  1.22    Ca    8.5      15 Apr 2024 09:44          TELEMETRY: 	    ECG:  	  RADIOLOGY:   DIAGNOSTIC TESTING:  [ ] Echocardiogram:  [ ]  Catheterization:  [ ] Stress Test:    OTHER: 	          
Subjective: Patient seen and examined. No new events except as noted.     REVIEW OF SYSTEMS:  Unable to obtain      MEDICATIONS:  MEDICATIONS  (STANDING):  acetaminophen     Tablet .. 975 milliGRAM(s) Oral every 8 hours  donepezil 10 milliGRAM(s) Oral at bedtime  enalapril 5 milliGRAM(s) Oral daily  enoxaparin Injectable 30 milliGRAM(s) SubCutaneous every 24 hours  metoprolol tartrate 50 milliGRAM(s) Oral daily  polyethylene glycol 3350 17 Gram(s) Oral daily  senna 2 Tablet(s) Oral at bedtime  sodium chloride 0.9%. 1000 milliLiter(s) (75 mL/Hr) IV Continuous <Continuous>  sodium chloride 0.9%. 1000 milliLiter(s) (125 mL/Hr) IV Continuous <Continuous>      PHYSICAL EXAM:  T(C): 36.6 (04-13-24 @ 20:20), Max: 36.7 (04-12-24 @ 23:48)  HR: 96 (04-13-24 @ 20:20) (83 - 97)  BP: 137/75 (04-13-24 @ 20:20) (132/74 - 144/70)  RR: 18 (04-13-24 @ 20:20) (16 - 18)  SpO2: 93% (04-13-24 @ 20:20) (93% - 98%)  Wt(kg): --  I&O's Summary    12 Apr 2024 07:01  -  13 Apr 2024 07:00  --------------------------------------------------------  IN: 300 mL / OUT: 0 mL / NET: 300 mL    13 Apr 2024 07:01  -  13 Apr 2024 22:34  --------------------------------------------------------  IN: 440 mL / OUT: 100 mL / NET: 340 mL        Appearance: NAD Right forehead hematoma   HEENT:   Normal oral mucosa, PERRL, EOMI	  Lymphatic: No lymphadenopathy  Cardiovascular: Normal S1 S2, No JVD, No murmurs, No edema  Respiratory: Decreased bs   Psychiatry: A & O x 2  Gastrointestinal:  Soft, Non-tender, + BS	  Skin: No rashes, No ecchymoses, No cyanosis	  Neurologic: Non-focal  RLE  Aquacel dressings x2 c/d/i  Sensation intact to light touch  (+) DF/PF/EHL/FHL  1+ DP/PT pulses.  Calves soft, nontender bilaterally    LABS:    CARDIAC MARKERS:                                9.1    6.49  )-----------( 121      ( 12 Apr 2024 06:37 )             29.0     04-12    146<H>  |  114<H>  |  33<H>  ----------------------------<  82  4.2   |  19<L>  |  1.25    Ca    8.5      12 Apr 2024 06:37      proBNP:   Lipid Profile:   HgA1c:   TSH:             TELEMETRY: 	    ECG:  	  RADIOLOGY:   DIAGNOSTIC TESTING:  [ ] Echocardiogram:  [ ]  Catheterization:  [ ] Stress Test:    OTHER: 	          
Subjective: Patient seen and examined. No new events except as noted.     REVIEW OF SYSTEMS:    CONSTITUTIONAL:+ weakness, fevers or chills  EYES/ENT: No visual changes;  No vertigo or throat pain   NECK: No pain or stiffness  RESPIRATORY: No cough, wheezing, hemoptysis; No shortness of breath  CARDIOVASCULAR: No chest pain or palpitations  GASTROINTESTINAL: No abdominal or epigastric pain. No nausea, vomiting, or hematemesis; No diarrhea or constipation. No melena or hematochezia.  GENITOURINARY: No dysuria, frequency or hematuria  NEUROLOGICAL: No numbness or weakness  SKIN: No itching, burning, rashes, or lesions   All other review of systems is negative unless indicated above.    MEDICATIONS:  MEDICATIONS  (STANDING):  donepezil 10 milliGRAM(s) Oral at bedtime  metoprolol tartrate 50 milliGRAM(s) Oral daily  sodium chloride 0.9%. 1000 milliLiter(s) (90 mL/Hr) IV Continuous <Continuous>      PHYSICAL EXAM:  T(C): 36.9 (04-10-24 @ 05:14), Max: 36.9 (04-09-24 @ 19:30)  HR: 99 (04-10-24 @ 05:14) (85 - 100)  BP: 118/67 (04-10-24 @ 05:14) (94/58 - 152/77)  RR: 18 (04-10-24 @ 05:14) (17 - 22)  SpO2: 96% (04-10-24 @ 05:14) (92% - 97%)  Wt(kg): --  I&O's Summary          Appearance: NAD Right forehead hematoma   HEENT:   Normal oral mucosa, PERRL, EOMI	  Lymphatic: No lymphadenopathy  Cardiovascular: Normal S1 S2, No JVD, No murmurs, No edema  Respiratory: Decreased bs   Psychiatry: A & O x 2  Gastrointestinal:  Soft, Non-tender, + BS	  Skin: No rashes, No ecchymoses, No cyanosis	  Neurologic: Non-focal  RLE:  Skin intact, shortened and externally rotated  No appreciable mihir and no ecchymosis over R hip  +TTP over R hip, no TTP along remainder of extremity; compartments soft  Limited ROM at hip 2/2 pain  +Log roll test  +Pain with axial loading  Motor: TA/EHL/GS/FHL intact  Sensory: DP/SP/Tib/Melania/Saph SILT  +DP pulse, WWP      LABS:    CARDIAC MARKERS:  CARDIAC MARKERS ( 09 Apr 2024 09:09 )  x     / x     / 193 U/L / x     / x      CARDIAC MARKERS ( 09 Apr 2024 06:54 )  x     / x     / 280 U/L / x     / x                                    10.4   7.71  )-----------( 153      ( 10 Apr 2024 02:50 )             31.5     04-10    142  |  109<H>  |  32<H>  ----------------------------<  133<H>  4.9   |  21<L>  |  1.57<H>    Ca    8.8      10 Apr 2024 02:50    TPro  5.5<L>  /  Alb  3.2<L>  /  TBili  0.6  /  DBili  x   /  AST  12  /  ALT  11  /  AlkPhos  73  04-09    proBNP:   Lipid Profile:   HgA1c:   TSH:     Present          TELEMETRY: 	SR    ECG:  	  RADIOLOGY:   DIAGNOSTIC TESTING:  [ ] Echocardiogram:  e< from: TTE Limited W or WO Ultrasound Enhancing Agent (04.09.24 @ 15:25) >    ADDENDUM TO PRIOR ECHOCARDIOGRAM REPORT        Navi Gimenez MD on 4/9/2024 at 5:16:12 PM                        TRANSTHORACIC ECHOCARDIOGRAM REPORT  ________________________________________________________________________________                      _______       Pt. Name:       TOMASA FUENTES Study Date:    4/9/2024  MRN:            [id number]    YOB: 1926  Accession #:    [id number]     Age:           89 years  Account#:       [id number]  Gender:        M  Heart Rate:     96 bpm        Height:        64.96 in (165.00 cm)  Rhythm:         sinus rhythm  Weight:        141.09 lb (64.00 kg)  Blood Pressure: 125/82 mmHg   BSA/BMI:       1.70 m² / 23.51 kg/m²  ________________________________________________________________________________________  Referring Physician:    Real Cardoso  Interpreting Physician: Navi Gimenez MD  Primary Sonographer:    Gamal Forrest RDCS    CPT:                ECHO TTE W CON FU LTD - .m;LIMITED SPECTRAL -     36809.m;DEFINITY ECHO CONTRAST PER ML WASTED -                      .m;DEFINITY ECHO CONTRAST PER ML - .m  Indication(s):      Abnormal electrocardiogram ECG/EKG - R94.31  Procedure:          Limited transthoracic echocardiogram.  Ordering Location:  [facility]  Admission Status:   Inpatient  Contrast Injection: Verbal consent was obtained for injection of Ultrasonic                      Enhancing Agent following a discussion of risks and                      benefits.    Endocardial visualization enhanced with 2 ml of Definity                      Ultrasound enhancing agent (Lot#:[id number] Exp.Date:Jan25                      Discarded Dose:8ml).  UEA Reaction:       Patient had no adverse reaction after injection of                   Ultrasound Enhancing Agent.  Study Information:  Image quality for this study is less than ideal.    _______________________________________________________________________________________     CONCLUSIONS:      1. Left ventricular cavity is small. Left ventricular systolic function is hyperdynamic. Regional wall motion abnormalities present.   2. 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   3. Mild to moderate pulmonary hypertension.    ________________________________________________________________________________________  FINDINGS:     Left Ventricle:  After obtaining consent, Definity ultrasound enhancing agent was given for enhanced left ventricular opacification and improved delineation of the left ventricular endocardial borders. The left ventricular cavity is small. Left ventricular wall thickness is normal. Left ventricular systolic function is hyperdynamic with an ejection fraction visually estimated at 70 to 75%. There are regional wall motion abnormalities present. Impaired relaxation with normal filling pressure. 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     Right Ventricle:  The right ventricular cavity is normal in size and normal systolic function. Tricuspid annular plane systolic excursion (TAPSE) is 2.0 cm (normal >=1.7 cm).     Left Atrium:  The left atrium is normal in size with an indexed volume of 10.60 ml/m².     Right Atrium:  The right atrium is normal in size.     Interatrial Septum:  The interatrial septum appears intact.     Aortic Valve:  There is fibrocalcific aortic valve sclerosis without stenosis.     Mitral Valve:  Structurally normal mitral valve with normal leaflet excursion. There is calcification of the mitral valve annulus.     Tricuspid Valve:  Structurally normal tricuspid valve with normal leaflet excursion. There is mild tricuspid regurgitation. Estimated pulmonary artery systolic pressure is 48 mmHg, consistent with mild to moderate pulmonary hypertension.     Pulmonic Valve:  Normal pulmonic valve.     Aorta:  The aortic root and aortic annulus appear normal in size.     Pericardium:  There is a small pericardial effusion noted adjacent to the lateral left ventricle with no evidence of hemodynamic compromise (or echocardiographic evidence of cardiac tamponade).     Systemic Veins:  The inferior vena cava is normal in size (normal <2.1cm) with normal inspiratory collapse (normal >50%) consistent with normal right atrial pressure (~3, range0-5mmHg).  ____________________________________________________________________  QUANTITATIVE DATA:  Left Ventricle Measurements: (Indexed to BSA)     IVSd (2D):   1.1 cm  LVPWd (2D):  0.8 cm  LVIDd (2D):  3.7 cm  LVIDs (2D):  3.2 cm  LV Mass:     101 g  59.0 g/m²  Visualized LV EF%: 70 to 75%     MV E Vmax:    0.47 m/s  MV A Vmax:    0.67 m/s  MV E/A:       0.71  e' lateral:   4.58 cm/s  e' medial:    4.87 cm/s  E/e' lateral: 10.35  E/e' medial:  9.73  E/e' Average: 10.03  MV DT:        63 msec    Aorta Measurements: (Normal range) (Indexed to BSA)     Sinuses of Valsalva: 3.40 cm (3.1 - 3.7 cm)       Left Atrium Measurements: (Indexed to BSA)  LA Diam 2D:        2.90 cm  LA Vol s, MOD A4C: 17.00 ml.  LA Vol s, MOD A2C: 18.00 ml.  LA Vol s, MOD BP:  18.00 ml  10.60 ml/m²    Right Ventricle Measurements:     TAPSE: 2.0 cm       LVOT / RVOT/ Qp/Qs Data: (Indexed to BSA)  LVOT Diameter: 2.10 cm  LVOT Vmax:     0.56 m/s  LVOT VTI:      15.60 cm  LVOT SV:       54.0 ml  31.69 ml/m²    Mitral Valve Measurements:     MV E Vmax: 0.5 m/s  MV A Vmax: 0.7 m/s  MV E/A:    0.7       Tricuspid Valve Measurements:     TR Vmax:          3.4 m/s  TR Peak Gradient: 44.9 mmHg  RA Pressure:      3 mmHg  PASP:             48 mmHg    ________________________________________________________________________________________  Electronically signed on 4/9/2024 at 5:04:35 PM by Navi Gimenez MD         *** Final (Updated) ***    < end of copied text >    [ ]  Catheterization:  [ ] Stress Test:    OTHER: 	          
Subjective: Patient seen and examined. No new events except as noted.     REVIEW OF SYSTEMS:  Unable to obtain      MEDICATIONS:  MEDICATIONS  (STANDING):  acetaminophen     Tablet .. 975 milliGRAM(s) Oral every 8 hours  donepezil 10 milliGRAM(s) Oral at bedtime  enalapril 5 milliGRAM(s) Oral daily  enoxaparin Injectable 30 milliGRAM(s) SubCutaneous every 24 hours  metoprolol tartrate 50 milliGRAM(s) Oral daily  polyethylene glycol 3350 17 Gram(s) Oral daily  senna 2 Tablet(s) Oral at bedtime  sodium chloride 0.9%. 1000 milliLiter(s) (125 mL/Hr) IV Continuous <Continuous>  sodium chloride 0.9%. 1000 milliLiter(s) (75 mL/Hr) IV Continuous <Continuous>      PHYSICAL EXAM:  T(C): 36.6 (04-14-24 @ 07:50), Max: 36.9 (04-13-24 @ 23:50)  HR: 74 (04-14-24 @ 07:50) (74 - 105)  BP: 111/68 (04-14-24 @ 07:50) (110/60 - 146/63)  RR: 18 (04-14-24 @ 07:50) (16 - 18)  SpO2: 92% (04-14-24 @ 07:50) (92% - 98%)  Wt(kg): --  I&O's Summary    13 Apr 2024 07:01  -  14 Apr 2024 07:00  --------------------------------------------------------  IN: 640 mL / OUT: 200 mL / NET: 440 mL            Appearance: NAD Right forehead hematoma   HEENT:   Normal oral mucosa, PERRL, EOMI	  Lymphatic: No lymphadenopathy  Cardiovascular: Normal S1 S2, No JVD, No murmurs, No edema  Respiratory: Decreased bs   Psychiatry: A & O x 2  Gastrointestinal:  Soft, Non-tender, + BS	  Skin: No rashes, No ecchymoses, No cyanosis	  Neurologic: Non-focal  RLE  Aquacel dressings x2 c/d/i  Sensation intact to light touch  (+) DF/PF/EHL/FHL  1+ DP/PT pulses.  Calves soft, nontender bilaterall      LABS:    CARDIAC MARKERS:                  proBNP:   Lipid Profile:   HgA1c:   TSH:             TELEMETRY: 	    ECG:  	  RADIOLOGY:   DIAGNOSTIC TESTING:  [ ] Echocardiogram:  [ ]  Catheterization:  [ ] Stress Test:    OTHER:

## 2024-04-16 NOTE — PROGRESS NOTE ADULT - PROBLEM SELECTOR PLAN 2
? mechanical  monitor on Tele overnight

## 2024-04-16 NOTE — DISCHARGE NOTE PROVIDER - HOSPITAL COURSE
HPI:  89 yo male h/o htn, dementia, cad s/p pci, p/w R hip pain after being found down. Pt only A&Ox1 in ED. History obtained from son Vlad. Per son pt lives at home with visiting nurse services and was found down outside by neighbor early this AM. Camera footage suggests patient likely fell. Was brought to ED and now has R hip pain. Has been unable to bear weight in the RLE since the fall. Pt unclear regarding events surrounding incident and does not know if he hit his head or lost consciousness but does have a scalp lac. At baseline, pt lives in apartment with help of nursing services and ambulates with a walker/cane (09 Apr 2024 14:43)    Hospital Course: 89 yo male h/o dementia, htn, cad s/p pci, here after being found down on floor with head trauma and with right hip fracture, here after being found down on floor with head trauma and with right hip fracture. Orthopedic following -> s/p Right Femur IM Nailing, c/w pain management. Pt noted also with elevated troponin, likely in setting of fall, trending down. Pt found to be anemic after sx and required  blood transfusion and responded appropriately  and  now medically  stable for discharge to [facility].       Important Medication Changes and Reason:    Active or Pending Issues Requiring Follow-up:  Follow up with  Orthopedic     Advanced Directives:   [ ] Full code  [ x] DNR  [ ] Hospice    Discharge Diagnoses:  Fall   cad  Right hip fracture

## 2024-04-16 NOTE — PROGRESS NOTE ADULT - ASSESSMENT
89 yo male h/o dementia, htn, cad s/p pci, here after being found down on floor with head trauma and with right hip fracture   also with +trops    right hip fx  ortho eval noted  s/p IMN  POD 6  tolerated surg well  post op care per ortho  pain control  PT    post op anemia  likely 2/2 blood loss during surg  s/p  1 unit prbc    +trop  possible nstemi  cards consulted  trend trop  tele  echo noted    cad s/p pci  cards f/u    htn  cont home meds    head lac  s/p sutures  local wound care    dementia  stable  cont Donepezil      PT    dvt ppx    dc planning      Advanced care planning was discussed with patient and family.  Advanced care planning forms were reviewed and discussed as appropriate.  Differential diagnosis and plan of care discussed with patient after the evaluation.   Pain assessed and judicious use of narcotics when appropriate was discussed.  Importance of Fall prevention discussed.  Counseling on Smoking and Alcohol cessation was offered when appropriate.  Counseling on Diet, exercise, and medication compliance was done.       Approx 75 minutes spent.

## 2024-04-16 NOTE — DISCHARGE NOTE NURSING/CASE MANAGEMENT/SOCIAL WORK - NSDCVIVACCINE_GEN_ALL_CORE_FT
Tdap; 09-Apr-2024 06:58; Izabella Aguirre (RN); [employer]; [id number] (Exp. Date: 20-Jul-2025); IntraMuscular; Deltoid Left.; 0.5 milliLiter(s); VIS (VIS Published: 09-May-2013, VIS Presented: 09-Apr-2024);

## 2024-04-16 NOTE — DISCHARGE NOTE PROVIDER - NSDCMRMEDTOKEN_GEN_ALL_CORE_FT
donepezil 10 mg oral tablet: 1 tab(s) orally once a day (at bedtime)  enalapril 5 mg oral tablet: 1 tab(s) orally once a day  metoprolol tartrate 50 mg oral tablet: 1 tab(s) orally once a day  otc supplement(s): multivitamin / vitamin d3   acetaminophen 325 mg oral tablet: 3 tab(s) orally every 8 hours  bisacodyl 10 mg rectal suppository: 1 suppository(ies) rectal once a day As needed If no bowel movement  donepezil 10 mg oral tablet: 1 tab(s) orally once a day (at bedtime)  enalapril 5 mg oral tablet: 1 tab(s) orally once a day  melatonin 3 mg oral tablet: 1 tab(s) orally once a day (at bedtime) As needed Insomnia  metoprolol tartrate 50 mg oral tablet: 1 tab(s) orally once a day  otc supplement(s): multivitamin / vitamin d3  oxyCODONE 5 mg oral tablet: 1 tab(s) orally every 4 hours As needed Severe Pain (7 - 10)  oxyCODONE 5 mg oral tablet: 0.5 tab(s) orally every 4 hours as needed for  moderate pain MDD: 3 tab  polyethylene glycol 3350 oral powder for reconstitution: 17 gram(s) orally once a day  senna leaf extract oral tablet: 2 tab(s) orally once a day (at bedtime)   acetaminophen 325 mg oral tablet: 3 tab(s) orally every 8 hours  bisacodyl 10 mg rectal suppository: 1 suppository(ies) rectal once a day As needed If no bowel movement  donepezil 10 mg oral tablet: 1 tab(s) orally once a day (at bedtime)  enalapril 5 mg oral tablet: 1 tab(s) orally once a day  Lovenox 30 mg/0.3 mL injectable solution: 30 milligram(s) subcutaneously every 24 hours  melatonin 3 mg oral tablet: 1 tab(s) orally once a day (at bedtime) As needed Insomnia  metoprolol tartrate 50 mg oral tablet: 1 tab(s) orally once a day  otc supplement(s): multivitamin / vitamin d3  oxyCODONE 5 mg oral tablet: 1 tab(s) orally every 4 hours As needed Severe Pain (7 - 10)  oxyCODONE 5 mg oral tablet: 0.5 tab(s) orally every 4 hours as needed for  moderate pain MDD: 3 tab  polyethylene glycol 3350 oral powder for reconstitution: 17 gram(s) orally once a day  senna leaf extract oral tablet: 2 tab(s) orally once a day (at bedtime)

## 2024-04-16 NOTE — PROGRESS NOTE ADULT - ASSESSMENT
89 y/o M s/p R IM Nail now POD 5 recovering well on floor    PLAN  WBAT  Pain control PRN  DVT ppx- Lovenox 30mg SQ Daily  PT/OT  Dispo: [facility]   89 y/o M s/p R IM Nail now POD 5 recovering well on floor    PLAN  WBAT  Pain control PRN  DVT ppx- Lovenox 30mg SQ Daily  PT/OT  Dispo: [facility]  No further orthopedic intervention at this time

## 2024-04-16 NOTE — PROGRESS NOTE ADULT - PROBLEM SELECTOR PLAN 1
Ortho consulted   OR planned today  acceptable (low to intermediate)  cardiac risk to proceed   RCRI 1  Pain control.
s/p Right Femur IM Nailing  DVT ppx   Pain control.

## 2024-07-05 NOTE — PRE-OP CHECKLIST - BP NONINVASIVE DIASTOLIC (MM HG)
Requested Prescriptions     Pending Prescriptions Disp Refills    bisoprolol-hydroCHLOROthiazide (ZIAC) 10-6.25 MG per tablet [Pharmacy Med Name: BISOPROLOL/HCTZ TABS 10/6.25MG] 180 tablet 3     Sig: TAKE 2 TABLETS DAILY            Checked Correct Pharmacy: Yes    Any changes since last refill? No     Number: 180    Refills: 3    Last Office Visit: 7/13/2023     Next Office Visit: 7/15/2024     Last Refill: 07/10/2023    Last Labs: 06/06/2024        72

## (undated) DEVICE — POSITIONER FOAM HEADREST (PINK)

## (undated) DEVICE — SUT POLYSORB 2-0 30" GS-21 UNDYED

## (undated) DEVICE — ELCTR BOVIE PENCIL SMOKE EVACUATION

## (undated) DEVICE — STAPLER SKIN VISI-STAT 35 WIDE

## (undated) DEVICE — SOL IRR POUR H2O 250ML

## (undated) DEVICE — DRILL BIT STRYKER ORTHO LOKG 4.2X360MM

## (undated) DEVICE — DRSG ACE BANDAGE 6"

## (undated) DEVICE — VENODYNE/SCD SLEEVE CALF MEDIUM

## (undated) DEVICE — POSITIONER FOAM EGG CRATE ULNAR 2PCS (PINK)

## (undated) DEVICE — DRILL BIT STRYKER ORTHO 4.2 X 340MM

## (undated) DEVICE — DRSG TEGADERM 4X4.75"

## (undated) DEVICE — DRSG WEBRIL 6"

## (undated) DEVICE — DRAPE 3/4 SHEET W REINFORCEMENT 56X77"

## (undated) DEVICE — SOL IRR POUR NS 0.9% 500ML

## (undated) DEVICE — WARMING BLANKET UPPER ADULT

## (undated) DEVICE — SUT POLYSORB 0 30" GS-21 UNDYED

## (undated) DEVICE — TAPE SILK 3"

## (undated) DEVICE — DRAPE MAYO STAND 30"

## (undated) DEVICE — PACK HIP PINNING

## (undated) DEVICE — GLV 8.5 PROTEXIS (WHITE)